# Patient Record
Sex: FEMALE | Race: WHITE | Employment: OTHER | URBAN - METROPOLITAN AREA
[De-identification: names, ages, dates, MRNs, and addresses within clinical notes are randomized per-mention and may not be internally consistent; named-entity substitution may affect disease eponyms.]

---

## 2019-06-19 ENCOUNTER — HOSPITAL ENCOUNTER (OUTPATIENT)
Age: 84
Discharge: HOME | End: 2019-06-21
Payer: MEDICARE

## 2019-06-19 PROCEDURE — 87070 CULTURE OTHR SPECIMN AEROBIC: CPT

## 2019-06-22 LAB
ORGANISM: ABNORMAL
WOUND/ABSCESS: ABNORMAL
WOUND/ABSCESS: ABNORMAL

## 2019-12-11 DIAGNOSIS — I10 ESSENTIAL HYPERTENSION: ICD-10-CM

## 2019-12-11 DIAGNOSIS — Z95.5 STENTED CORONARY ARTERY: ICD-10-CM

## 2019-12-11 DIAGNOSIS — E78.2 MIXED HYPERLIPIDEMIA: ICD-10-CM

## 2019-12-11 DIAGNOSIS — I25.10 CORONARY ARTERY DISEASE INVOLVING NATIVE CORONARY ARTERY OF NATIVE HEART WITHOUT ANGINA PECTORIS: ICD-10-CM

## 2020-02-13 ENCOUNTER — OFFICE VISIT (OUTPATIENT)
Dept: CARDIOLOGY CLINIC | Age: 85
End: 2020-02-13
Payer: MEDICARE

## 2020-02-13 VITALS
BODY MASS INDEX: 27.97 KG/M2 | SYSTOLIC BLOOD PRESSURE: 130 MMHG | HEIGHT: 66 IN | DIASTOLIC BLOOD PRESSURE: 72 MMHG | WEIGHT: 174 LBS | HEART RATE: 79 BPM

## 2020-02-13 PROCEDURE — 93000 ELECTROCARDIOGRAM COMPLETE: CPT | Performed by: INTERNAL MEDICINE

## 2020-02-13 PROCEDURE — 99214 OFFICE O/P EST MOD 30 MIN: CPT | Performed by: INTERNAL MEDICINE

## 2020-02-13 RX ORDER — GABAPENTIN 600 MG/1
600 TABLET ORAL 2 TIMES DAILY
COMMUNITY

## 2020-02-13 RX ORDER — CYANOCOBALAMIN (VITAMIN B-12) 1000 MCG
1 TABLET, EXTENDED RELEASE ORAL 2 TIMES DAILY WITH MEALS
COMMUNITY

## 2020-02-13 RX ORDER — LISINOPRIL 10 MG/1
10 TABLET ORAL 2 TIMES DAILY
COMMUNITY

## 2020-02-13 RX ORDER — AMOXICILLIN 500 MG
CAPSULE ORAL
COMMUNITY
End: 2022-08-08 | Stop reason: ALTCHOICE

## 2020-02-13 NOTE — PROGRESS NOTES
Outpatient Cardiology - Office Visit Follow-up    Date of Consultation: 2/13/2020    HISTORY OF PRESENT ILLNESS:   This is a follow-up for this 71-year-old female with previous history of coronary stent placed in the LAD in 2013. She has a history of hypertension and hyperlipidemia and had an extensive hospital stay for C. difficile in the distant past.  Patient had previous stress test in October 2013 which was negative for stress-induced ischemia. Patient does have a history of valvular heart disease by 2D echocardiogram with report unavailable. She has had a long history of skin cancer (melanoma )and has had major surgery on her right lower extremity appears to be part of the anterior pretibial muscle which has been resected. She now has cancer on her distal left extremity and is awaiting surgery early next week. Patient has concern on today's visit with mid back pain which disappeared after placement of the previous stent in 2009. She states symptoms are concerning for her but she denies any shortness of breath at there are no provocative or  alleviating factors associated with this however she does complain of right shoulder pain   with moving her arm which also affects the back pain. She follows for hypertension and hyperlipidemia with her PCP      Please note: past medical records were reviewed per electronic medical record (EMR) - see detailed reports under Past Medical/ Surgical History.    PAST MEDICAL HISTORY:    Past Medical History:   Diagnosis Date    Asthma     CAD (coronary artery disease)     Cancer ([facility] [address]     melanoma    Heart attack ([facility] [address] 01/2013    Hyperlipidemia     Hypertension     Neuropathy     Stented coronary artery     Wears hearing aid        PAST SURGICAL HISTORY:    Past Surgical History:   Procedure Laterality Date    BACK SURGERY      lumbar    BLADDER SUSPENSION      CORONARY ANGIOPLASTY WITH STENT PLACEMENT  01/2013    3.0 x 20 mm in LAD    DIAGNOSTIC thrills; PMI is non-displaced. Heart Ausculation- Regular rate and rhythm, no murmur. No s3, s4 or rub. ABDOMEN: Soft, non-tender to light palpation. Bowel sounds present. No palpable masses no organomegaly; no abdominal bruit. MS: Good muscle strength and tone. No atrophy or abnormal movements. EXTREMITIES: Right lower extremity with muscle resection distal tibia and marked facial patient's distal left lower extremity awaiting surgery next week  : Deferred  SKIN: Warm and dry. NEURO  CN 2-12 intact  no motor deficits    PSYCH: Oriented to person, place and time. Speech clear and appropriate. Follows all commands. Pleasant affect. DATA:    ECG    (interpreted by me):  Sinus  Rhythm   Low voltage in precordial leads. ABNORMAL       Diagnostic:        Labs:   CBC: No results for input(s): WBC, HGB, HCT, PLT in the last 72 hours. BMP: No results for input(s): NA, K, CO2, BUN, CREATININE, LABGLOM, CALCIUM in the last 72 hours. Invalid input(s): GLU  Mag: No results for input(s): MG in the last 72 hours. Phos: No results for input(s): PHOS in the last 72 hours. TSH: No results for input(s): TSH in the last 72 hours. HgA1c: No results found for: LABA1C  No results found for: EAG  BNP: No results for input(s): BNP in the last 72 hours. PT/INR: No results for input(s): PROTIME, INR in the last 72 hours. APTT:No results for input(s): APTT in the last 72 hours. CARDIAC ENZYMES:No results for input(s): CKTOTAL, CKMB, CKMBINDEX, TROPONINI in the last 72 hours. FASTING LIPID PANEL:No results found for: CHOL, HDL, LDLDIRECT, LDLCALC, TRIG  LIVER PROFILE:No results for input(s): AST, ALT, LABALBU in the last 72 hours. ASSESSMENT:  History of ischemic heart disease previous PCI LAD 2009.   Patient this visit complaining of mid back pain similar to previous complaints with right shoulder pain exacerbated by movement and palpation  History of melanoma with distal right extremity myomectomy and

## 2020-02-14 ENCOUNTER — HOSPITAL ENCOUNTER (OUTPATIENT)
Dept: NON INVASIVE DIAGNOSTICS | Age: 85
Discharge: HOME | End: 2020-02-14
Payer: MEDICARE

## 2020-02-14 ENCOUNTER — HOSPITAL ENCOUNTER (OUTPATIENT)
Dept: NUCLEAR MEDICINE | Age: 85
Discharge: HOME | End: 2020-02-14
Payer: MEDICARE

## 2020-02-14 LAB
LV EF: 60 %
LV EF: 87 %
LVEF MODALITY: NORMAL
LVEF MODALITY: NORMAL

## 2020-02-14 PROCEDURE — 3430000000 HC RX DIAGNOSTIC RADIOPHARMACEUTICAL: Performed by: RADIOLOGY

## 2020-02-14 PROCEDURE — 78452 HT MUSCLE IMAGE SPECT MULT: CPT

## 2020-02-14 PROCEDURE — 93018 CV STRESS TEST I&R ONLY: CPT | Performed by: INTERNAL MEDICINE

## 2020-02-14 PROCEDURE — A9500 TC99M SESTAMIBI: HCPCS | Performed by: RADIOLOGY

## 2020-02-14 PROCEDURE — 93306 TTE W/DOPPLER COMPLETE: CPT

## 2020-02-14 PROCEDURE — 6360000002 HC RX W HCPCS: Performed by: INTERNAL MEDICINE

## 2020-02-14 PROCEDURE — 93017 CV STRESS TEST TRACING ONLY: CPT

## 2020-02-14 PROCEDURE — 93016 CV STRESS TEST SUPVJ ONLY: CPT | Performed by: INTERNAL MEDICINE

## 2020-02-14 RX ADMIN — Medication 30 MILLICURIE: at 13:17

## 2020-02-14 RX ADMIN — REGADENOSON 0.4 MG: 0.08 INJECTION, SOLUTION INTRAVENOUS at 13:25

## 2020-02-14 RX ADMIN — Medication 10 MILLICURIE: at 11:32

## 2020-02-14 NOTE — PROCEDURES
Stress Testing Procedure Note    Type of Stress Testing:  rob scan  Date of Procedure:  2/14/2020  Indication:  Chest pain    Impression:    Baseline ekg is unremarkable No chest pain  or st changes  With IV lexiscan  Nuclear pending            Gavin Ren DO, FACC, FCCP

## 2020-03-04 ENCOUNTER — TELEPHONE (OUTPATIENT)
Dept: CARDIOLOGY CLINIC | Age: 85
End: 2020-03-04

## 2020-11-25 ENCOUNTER — OFFICE VISIT (OUTPATIENT)
Dept: SURGERY | Age: 85
End: 2020-11-25
Payer: MEDICARE

## 2020-11-25 VITALS
BODY MASS INDEX: 28.16 KG/M2 | OXYGEN SATURATION: 84 % | HEIGHT: 66 IN | TEMPERATURE: 98.6 F | WEIGHT: 175.2 LBS | HEART RATE: 52 BPM | DIASTOLIC BLOOD PRESSURE: 76 MMHG | SYSTOLIC BLOOD PRESSURE: 150 MMHG

## 2020-11-25 PROCEDURE — 1036F TOBACCO NON-USER: CPT | Performed by: PLASTIC SURGERY

## 2020-11-25 PROCEDURE — 1090F PRES/ABSN URINE INCON ASSESS: CPT | Performed by: PLASTIC SURGERY

## 2020-11-25 PROCEDURE — G8427 DOCREV CUR MEDS BY ELIG CLIN: HCPCS | Performed by: PLASTIC SURGERY

## 2020-11-25 PROCEDURE — G8484 FLU IMMUNIZE NO ADMIN: HCPCS | Performed by: PLASTIC SURGERY

## 2020-11-25 PROCEDURE — G8400 PT W/DXA NO RESULTS DOC: HCPCS | Performed by: PLASTIC SURGERY

## 2020-11-25 PROCEDURE — 4040F PNEUMOC VAC/ADMIN/RCVD: CPT | Performed by: PLASTIC SURGERY

## 2020-11-25 PROCEDURE — 1123F ACP DISCUSS/DSCN MKR DOCD: CPT | Performed by: PLASTIC SURGERY

## 2020-11-25 PROCEDURE — G8417 CALC BMI ABV UP PARAM F/U: HCPCS | Performed by: PLASTIC SURGERY

## 2020-11-25 PROCEDURE — 99203 OFFICE O/P NEW LOW 30 MIN: CPT | Performed by: PLASTIC SURGERY

## 2020-11-25 NOTE — PROGRESS NOTES
Department of Plastic Surgery - Adult  Attending Consult Note      CHIEF COMPLAINT:   Squamous Cell Cancer of left lower leg    History Obtained From:  patient, daughter    HISTORY OF PRESENT ILLNESS:                The patient is a 81 y.o. 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 The patient states that they first noticed the lesion several months ago. It has  grown in size since they first noticed the lesion. The lesion has  changed in color and has  had discharge or bleeding. The pt has had the lesion biopsied previously. The patient has not had the lesion removed previously. The patient states the lesion is at ttimes painfull. The pt denies any associated symptoms.       Past Medical History:    Past Medical History:   Diagnosis Date    Asthma     CAD (coronary artery disease)     Cancer ([facility] [address]     melanoma    H/O cardiovascular stress test 02/14/2020    Heart attack ([facility] [address] 01/2013    Hyperlipidemia     Hypertension     Neuropathy     Stented coronary artery     Wears hearing aid      Past Surgical History:    Past Surgical History:   Procedure Laterality Date    BACK SURGERY      lumbar    BLADDER SUSPENSION      CORONARY ANGIOPLASTY WITH STENT PLACEMENT  01/2013    3.0 x 20 mm in LAD    DIAGNOSTIC CARDIAC CATH LAB PROCEDURE      HYSTERECTOMY      KNEE SURGERY      both   Leland Funes and Dr. Sim Cohn (twice)    PTCA      TOTAL HIP ARTHROPLASTY      left & right     Current Medications:       Current Outpatient Medications   Medication Sig Dispense Refill    Omega-3 Fatty Acids (FISH OIL) 1200 MG CAPS Take by mouth      calcium citrate-vitamin D (CITRICAL + D) 315-250 MG-UNIT TABS per tablet Take 1 tablet by mouth 2 times daily (with meals)      lisinopril (PRINIVIL;ZESTRIL) 10 MG tablet Take 10 mg by mouth daily      Omeprazole 20 MG TBDD Take by mouth      gabapentin (NEURONTIN) 600 MG tablet Take 600 mg by mouth 3 times daily.  alendronate (FOSAMAX) 70 MG tablet Take 70 mg by mouth Once a week.  clopidogrel (PLAVIX) 75 MG tablet Take 75 mg by mouth Daily.  metoprolol (TOPROL-XL) 25 MG XL tablet Take 12.5 mg by mouth Twice daily.  simvastatin (ZOCOR) 40 MG tablet Take 40 mg by mouth Daily.  therapeutic multivitamin-minerals (THERAGRAN-M) tablet Take 1 tablet by mouth daily. No current facility-administered medications for this visit. Allergies:  Ketamine; Feldene [piroxicam];  Oxycontin [oxycodone hcl]; Sulfa antibiotics; and Pcn [penicillins]    Social History:   Social History     Socioeconomic History    Marital status:      Spouse name: Not on file    Number of children: Not on file    Years of education: Not on file    Highest education level: Not on file   Occupational History    Not on file   Social Needs    Financial resource strain: Not on file    Food insecurity     Worry: Not on file     Inability: Not on file   [employer] needs     Medical: Not on file     Non-medical: Not on file   Tobacco Use    Smoking status: Never Smoker    Smokeless tobacco: Never Used   Substance and Sexual Activity    Alcohol use: Yes     Comment: very little; 2-3 cups coffee/tea daily    Drug use: No    Sexual activity: Not on file   Lifestyle    Physical activity     Days per week: Not on file     Minutes per session: Not on file    Stress: Not on file   Relationships    Social connections     Talks on phone: Not on file     Gets together: Not on file     Attends Voodoo service: Not on file     Active member of club or organization: Not on file     Attends meetings of clubs or organizations: Not on file     Relationship status: Not on file    Intimate partner violence     Fear of current or ex partner: Not on file     Emotionally abused: Not on file     Physically abused: Not on file     Forced sexual activity: Not on file   Other Topics Concern    Not on file   Social History Narrative    Not on file     Family History:   Family History   Problem Relation Age of Onset    Heart Attack Father        REVIEW OF SYSTEMS:    CONSTITUTIONAL:  negative for  fevers, chills, sweats and fatigue  EYES: negative for dipolpia or acute vision loss. RESPIRATORY:  negative for  dry cough, cough with sputum, dyspnea, wheezing and chest pain  HENT:negative for pain, headache, difficulty swallowing or nose bleeds. CARDIOVASCULAR:  negative for  chest pain, dyspnea, palpitations, syncope  GASTROINTESTINAL:  negative for nausea, vomiting, change in bowel habits, diarrhea, constipation and abdominal pain  EXTREMITIES: negative for edema  MUSCULOSKELETAL: negative for muscle weakness  SKIN: positive for lesion, negative for itching or rashes. HEME: negative for easy brusing or bleeding  PSYCH: Alert and oriented to person place and time    I have reviewed the chief complaint and history of present illness including (ROS and PFSH) and vital documentation by my staff and I agree with their documentation and have added when applicable. PHYSICAL EXAM:    VITALS:  BP (!) 150/76 (Site: Left Upper Arm, Position: Sitting, Cuff Size: Medium Adult)   Pulse 52   Temp 98.6 °F (37 °C) (Temporal)   Ht 5' 6\" (1.676 m)   Wt 175 lb 3.2 oz (79.5 kg)   SpO2 (!) 84%   BMI 28.28 kg/m²   CONSTITUTIONAL:  awake, alert, cooperative, no apparent distress, and appears stated age  EYES: PERRLA, EOMI, no signs of occular infection  LUNGS:  No increased work of breathing, good air exchange, clear to auscultation bilaterally, no crackles or wheezing  CARDIOVASCULAR:  Normal apical impulse, regular rate and rhythm  EXTREMITIES: no signs of clubbing or cyanosis. MUSCULOSKELETAL: negative for flaccid muscle tone or spastic movements. NEURO: Cranial nerves II-XII grossly intact. No signs of agitated mood.      SKIN: Biopsy proven Olanta Hammersmith cell cancer of left lower leg-  20mm x 20mm,  red in color, irregular border, raised, no signs of bleeding,drainage or infection. Non tender to palpation. Scaly      DATA:    Labs: CBC: No results found for: WBC, RBC, HGB, HCT, MCV, MCH, MCHC, RDW, PLT, MPV  BMP:  No results found for: NA, K, CL, CO2, BUN, LABALBU, CREATININE, CALCIUM, GFRAA, LABGLOM, GLUCOSE    Radiology Review:  No radiology needed at this time  Pathology Review:  Pathology reviewed           IMPRESSION/RECOMMENDATIONS:        Diagnosis  -) Skin cancer left lower leg      -The patient was counseled on their pathology results and the need for surgical   intervention.   -We will plan to proceed and have the lesion formely excised with margins in the operating room. Patient would likely require full-thickness skin grafting to close this area of the leg.  -The patient will not require frozen sections in the operating room  -The patient will  require pre-op clearance from their PCP. -The risks, benefits and options were discussed with the pt. The risks included but not limited to pain, bleeding, infection, heavy scarring, damage to surrounding structures, fluid collections, asymmetry, and need for further procedures. All of Her questions were answered to their satisfaction and She agrees to proceed with the procedure. Photos obtained        This document is generated, in part, by voice recognition software and thus  syntax and grammatical errors are possible.     Jose Flood  9:34 AM  11/30/2020

## 2020-12-01 ENCOUNTER — TELEPHONE (OUTPATIENT)
Dept: SURGERY | Age: 85
End: 2020-12-01

## 2020-12-01 NOTE — TELEPHONE ENCOUNTER
Patient stated due to Covid Pandemic would like us to call her end of Jan.2021  To check if she is ready to schedule      Need medical clearance    Procedure excision of left lateral leg squamous cell carcinoma with full thickness skin graft

## 2021-01-21 ENCOUNTER — TELEPHONE (OUTPATIENT)
Dept: CARDIOLOGY CLINIC | Age: 86
End: 2021-01-21

## 2021-01-21 NOTE — TELEPHONE ENCOUNTER
Patient on march recall. When I called she said she is seeing Dr. Ene Rothman. Does not need an appointment.

## 2021-02-01 NOTE — TELEPHONE ENCOUNTER
Excision left lateral leg squamous cell carcinoma     Our office needs an medical clearance prior to procedure

## 2021-02-03 NOTE — TELEPHONE ENCOUNTER
Medical Clearance received  Surgery has been scheduled at [address] on 3/16//21, Pre-Admission Testing will call you prior to surgery to inform you arrival time and any other additional directions,if they are unable to reach you,please call them two days prior at [phone]. If taking Fish Oil, Vitamins, two weeks prior to surgery stop taking. If taking NSAIDS (such as Aspirin, Ibuprofen) anticoagulants please consult with your prescribing physician to get further instructions on when to stop medication prior to surgery that is scheduled, patient understood.     Pre-Auth #Medicare  CPT Codes: 81787

## 2021-02-17 NOTE — H&P
Department of Plastic Surgery - Adult  Attending Consult Note        CHIEF COMPLAINT:   Squamous Cell Cancer of left lower leg     History Obtained From:  patient, daughter     HISTORY OF PRESENT ILLNESS:                 The patient is a 81 y.o. 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 The patient states that they first noticed the lesion several months ago. It has  grown in size since they first noticed the lesion. The lesion has  changed in color and has  had discharge or bleeding. The pt has had the lesion biopsied previously. The patient has not had the lesion removed previously. The patient states the lesion is at ttimes painfull.   The pt denies any associated symptoms.       Past Medical History:    Past Medical History        Past Medical History:   Diagnosis Date    Asthma      CAD (coronary artery disease)      Cancer ([facility] [address]       melanoma    H/O cardiovascular stress test 02/14/2020    Heart attack ([facility] [address] 01/2013    Hyperlipidemia      Hypertension      Neuropathy      Stented coronary artery      Wears hearing aid           Past Surgical History:    Past Surgical History         Past Surgical History:   Procedure Laterality Date    BACK SURGERY         lumbar    BLADDER SUSPENSION        CORONARY ANGIOPLASTY WITH STENT PLACEMENT   01/2013     3.0 x 20 mm in LAD    DIAGNOSTIC CARDIAC CATH LAB PROCEDURE        HYSTERECTOMY        KNEE SURGERY         both    MALIGNANT SKIN LESION EXCISION         Dr. En Blank and Dr. Julien Means (twice)    PTCA        TOTAL HIP ARTHROPLASTY         left & right         Current Medications:       Current Facility-Administered Medications          Current Outpatient Medications   Medication Sig Dispense Refill    Omega-3 Fatty Acids (FISH OIL) 1200 MG CAPS Take by mouth        calcium citrate-vitamin D (CITRICAL + D) 315-250 MG-UNIT TABS per tablet Take 1 tablet by mouth 2 times daily (with meals)        lisinopril (PRINIVIL;ZESTRIL) 10 MG tablet Take 10 mg by mouth daily        Omeprazole 20 MG TBDD Take by mouth        gabapentin (NEURONTIN) 600 MG tablet Take 600 mg by mouth 3 times daily.        alendronate (FOSAMAX) 70 MG tablet Take 70 mg by mouth Once a week.        clopidogrel (PLAVIX) 75 MG tablet Take 75 mg by mouth Daily.        metoprolol (TOPROL-XL) 25 MG XL tablet Take 12.5 mg by mouth Twice daily.        simvastatin (ZOCOR) 40 MG tablet Take 40 mg by mouth Daily.        therapeutic multivitamin-minerals (THERAGRAN-M) tablet Take 1 tablet by mouth daily.          No current facility-administered medications for this visit.           Allergies:  Ketamine; Feldene [piroxicam]; Oxycontin [oxycodone hcl]; Sulfa antibiotics; and Pcn [penicillins]     Social History:   Social History               Socioeconomic History    Marital status:        Spouse name: Not on file    Number of children: Not on file    Years of education: Not on file    Highest education level: Not on file   Occupational History    Not on file   Social Needs    Financial resource strain: Not on file    Food insecurity       Worry: Not on file       Inability: Not on file    Transportation needs       Medical: Not on file       Non-medical: Not on file   Tobacco Use    Smoking status: Never Smoker    Smokeless tobacco: Never Used   Substance and Sexual Activity    Alcohol use:  Yes       Comment: very little; 2-3 cups coffee/tea daily    Drug use: No    Sexual activity: Not on file   Lifestyle    Physical activity       Days per week: Not on file       Minutes per session: Not on file    Stress: Not on file   Relationships    Social connections       Talks on phone: Not on file       Gets together: Not on file       Attends Restoration service: Not on file       Active member of club or organization: Not on file       Attends meetings of clubs or organizations: Not on file       Relationship status: Not on file  Intimate partner violence       Fear of current or ex partner: Not on file       Emotionally abused: Not on file       Physically abused: Not on file       Forced sexual activity: Not on file   Other Topics Concern    Not on file   Social History Narrative    Not on file         Family History:   Family History   Family History   Problem Relation Age of Onset    Heart Attack Father              REVIEW OF SYSTEMS:    CONSTITUTIONAL:  negative for  fevers, chills, sweats and fatigue  EYES: negative for dipolpia or acute vision loss. RESPIRATORY:  negative for  dry cough, cough with sputum, dyspnea, wheezing and chest pain  HENT:negative for pain, headache, difficulty swallowing or nose bleeds. CARDIOVASCULAR:  negative for  chest pain, dyspnea, palpitations, syncope  GASTROINTESTINAL:  negative for nausea, vomiting, change in bowel habits, diarrhea, constipation and abdominal pain  EXTREMITIES: negative for edema  MUSCULOSKELETAL: negative for muscle weakness  SKIN: positive for lesion, negative for itching or rashes.   HEME: negative for easy brusing or bleeding  PSYCH: Alert and oriented to person place and time     I have reviewed the chief complaint and history of present illness including (ROS and PFSH) and vital documentation by my staff and I agree with their documentation and have added when applicable.        PHYSICAL EXAM:    VITALS:  BP (!) 150/76 (Site: Left Upper Arm, Position: Sitting, Cuff Size: Medium Adult)   Pulse 52   Temp 98.6 °F (37 °C) (Temporal)   Ht 5' 6\" (1.676 m)   Wt 175 lb 3.2 oz (79.5 kg)   SpO2 (!) 84%   BMI 28.28 kg/m²   CONSTITUTIONAL:  awake, alert, cooperative, no apparent distress, and appears stated age  EYES: PERRLA, EOMI, no signs of occular infection  LUNGS:  No increased work of breathing, good air exchange, clear to auscultation bilaterally, no crackles or wheezing  CARDIOVASCULAR:  Normal apical impulse, regular rate and rhythm  EXTREMITIES: no signs of clubbing or cyanosis. MUSCULOSKELETAL: negative for flaccid muscle tone or spastic movements. NEURO: Cranial nerves II-XII grossly intact. No signs of agitated mood.      SKIN: Biopsy proven Bernette Evener cell cancer of left lower leg-  20mm x 20mm,  red in color, irregular border, raised, no signs of bleeding,drainage or infection. Non tender to palpation. Scaly        DATA:    Labs: CBC: No results found for: WBC, RBC, HGB, HCT, MCV, MCH, MCHC, RDW, PLT, MPV  BMP:  No results found for: NA, K, CL, CO2, BUN, LABALBU, CREATININE, CALCIUM, GFRAA, LABGLOM, GLUCOSE     Radiology Review:  No radiology needed at this time  Pathology Review:  Pathology reviewed              IMPRESSION/RECOMMENDATIONS:          Diagnosis  -) Skin cancer left lower leg        -The patient was counseled on their pathology results and the need for surgical   intervention.   -We will plan to proceed and have the lesion formely excised with margins in the operating room. Patient would likely require full-thickness skin grafting to close this area of the leg.  -The patient will not require frozen sections in the operating room  -The patient will  require pre-op clearance from their PCP.          -The risks, benefits and options were discussed with the pt. The risks included but not limited to pain, bleeding, infection, heavy scarring, damage to surrounding structures, fluid collections, asymmetry, and need for further procedures. All of Her questions were answered to their satisfaction and She agrees to proceed with the procedure.     Attestation    No change in patient's H & P. I have reviewed the procedure with the patient as well as the risk and benefits. They have no further questions and agree to proceed with surgery.     Marizol Summers MD   7:19 AM  3/16/2021

## 2021-03-09 NOTE — PROGRESS NOTES
Patient agreed to COVID test on 3/11/21 at the  [facility]  located at  [address] [place] [address] Drive the hours of 6 am- 2:30 pm, instructed to bring ID. Patient instructed to self isolate until day of surgery.

## 2021-03-10 ENCOUNTER — TELEPHONE (OUTPATIENT)
Dept: SURGERY | Age: 86
End: 2021-03-10

## 2021-03-10 NOTE — PROGRESS NOTES
FABIANO FROM DR BENITEZ RETURNED CALL. PER DR BENITEZ PATIENT MAY REMAIN ON HER PLAVIX. PATIENT CALLED AND INSTRUCTED THIS.

## 2021-03-10 NOTE — PROGRESS NOTES
Ludy 37 PRE-ADMISSION TESTING GENERAL INSTRUCTIONS- [facility]-phone number:[phone]    GENERAL INSTRUCTIONS  [x] Antibacterial Soap shower Night before and/or AM of Surgery  [] Gareth wipe instruction sheet and wipes given. [x] Nothing by mouth after midnight, including gum, candy, mints, or water. [x] You may brush your teeth, gargle, but do NOT swallow water. []Hibiclens shower  the night before and the morning of surgery. Do not use             Hibiclens on your face or head. [x]No smoking, chewing tobacco, illegal drugs, or alcohol within 24 hours of your surgery. [x] Jewelry, valuables or body piercing's should not be brought to the hospital. All body and/or tongue piercing's must be removed prior to arriving to hospital.  ALL hair pins must be removed. [x] Do not wear makeup, lotions, powders, deodorant. Nail polish as directed by the nurse. [x] Arrange transportation with a responsible adult  to and from the hospital. If you do not have a responsible adult  to transport you, you will need to make arrangements with a medical transportation company (i.e. [employer]. A Uber/taxi/bus is not appropriate unless you are accompanied by a responsible adult ). Arrange for someone to be with you for the remainder of the day and for 24 hours after your procedure due to having had anesthesia. Who will be your  for transportation? __DEBBIE________________   Who will be staying with you for 24 hrs after your procedure?______DAUGHTER____________  [x] Bring insurance card and photo ID.  [] Transfusion Bracelet: Please bring with you to hospital, day of surgery  [] Bring urine specimen day of surgery. Any small container is acceptable. [] Use inhalers the morning of surgery and bring with you to hospital.  [x] Bring copy of living will or healthcare power of  papers to be placed in your electronic record.   [] CPAP/BI-PAP: Please bring your machine if you are to spend the night in the hospital.     PARKING INSTRUCTIONS:   [x] Arrival Time:____0530_________  · [x] Parking lot '\"I\"  is located on [facility] (the corner of [facility] and [facility]). To enter, press the button and the gate will lift. A free token will be provided to exit the lot. One car per patient is allowed to park in this lot. All other cars are to park on [address] [place] [address] Street either in the parking garage or the handicap lot. [x] To reach the [facility] lobby from [address] [place] [address], upon entering the hospital, take elevator B to the 3rd floor. EDUCATION INSTRUCTIONS:      [] Knee or hip replacement booklet & exercise pamphlets given. [] Mamta 77 placed in chart. [] Pre-admission Testing educational folder given  [x] Incentive Spirometry,coughing & deep breathing exercises reviewed. [x]Medication information sheet(s)   []Fluoroscopy-Xray used in surgery reviewed with patient. Educational pamphlet placed in chart. [x]Pain: Post-op pain is normal and to be expected. You will be asked to rate your pain from 0-10(a zero is not acceptable-education is needed). Your post-op pain goal is:  [x] Ask your nurse for your pain medication. [] Joint camp offered. [] Joint replacement booklets given. [] Other:___________________________    MEDICATION INSTRUCTIONS:   [x]Bring a complete list of your medications, please write the last time you took the medicine, give this list to the nurse. [x] Take the following medications the morning of surgery with 1-2 ounces of water: SEE LIST  [x] Stop herbal supplements and vitamins 5 days before your surgery. [] DO NOT take any diabetic medicine the morning of surgery. Follow instructions for insulin the day before surgery. [] If you are diabetic and your blood sugar is low or you feel symptomatic, you may drink 1-2 ounces of apple juice or take a glucose tablet.   The morning of your procedure, you may call

## 2021-03-10 NOTE — PROGRESS NOTES
I CALLED DR BENITEZ OFFICE AND SPOKE TO MICA. IN REVIEWING PATIENTS MEDICATION SHE TAKES PLAVIX 75 MG DAILY. PATIENT STATES THAT SHE WAS NOT INSTRUCTED AS TO WHAT TO DO WITH HER PLAVIX PRE OP. MICA WILL INQUIRE AS TO WHAT TO DO WITH HER PLAVIX AND SHE WILL CALL PATIENT AND INFORM HER OF THIS. PATIENT NOTIFIED OF THIS AND SHE WILL CALL DR ELI VELASCO BY TOMORROW MID MORNING IF SHE DOES NOT HEAR BACK BY THEM.

## 2021-03-10 NOTE — TELEPHONE ENCOUNTER
Ambrosio from [facility] called office unclear on plavix order. Patient to have surgery on 3/16/2021.

## 2021-03-11 ENCOUNTER — HOSPITAL ENCOUNTER (OUTPATIENT)
Age: 86
Discharge: HOME | End: 2021-03-13
Payer: MEDICARE

## 2021-03-11 DIAGNOSIS — U07.1 COVID-19: ICD-10-CM

## 2021-03-11 PROCEDURE — U0003 INFECTIOUS AGENT DETECTION BY NUCLEIC ACID (DNA OR RNA); SEVERE ACUTE RESPIRATORY SYNDROME CORONAVIRUS 2 (SARS-COV-2) (CORONAVIRUS DISEASE [COVID-19]), AMPLIFIED PROBE TECHNIQUE, MAKING USE OF HIGH THROUGHPUT TECHNOLOGIES AS DESCRIBED BY CMS-2020-01-R: HCPCS

## 2021-03-12 LAB
SARS-COV-2: NOT DETECTED
SOURCE: NORMAL

## 2021-03-16 ENCOUNTER — ANESTHESIA EVENT (OUTPATIENT)
Dept: OPERATING ROOM | Age: 86
End: 2021-03-16
Payer: MEDICARE

## 2021-03-16 ENCOUNTER — HOSPITAL ENCOUNTER (OUTPATIENT)
Age: 86
Setting detail: OUTPATIENT SURGERY
Discharge: HOME | End: 2021-03-16
Attending: PLASTIC SURGERY | Admitting: PLASTIC SURGERY
Payer: MEDICARE

## 2021-03-16 ENCOUNTER — ANESTHESIA (OUTPATIENT)
Dept: OPERATING ROOM | Age: 86
End: 2021-03-16
Payer: MEDICARE

## 2021-03-16 VITALS
WEIGHT: 176 LBS | HEART RATE: 75 BPM | TEMPERATURE: 97 F | HEIGHT: 66 IN | BODY MASS INDEX: 28.28 KG/M2 | OXYGEN SATURATION: 98 % | DIASTOLIC BLOOD PRESSURE: 72 MMHG | RESPIRATION RATE: 16 BRPM | SYSTOLIC BLOOD PRESSURE: 163 MMHG

## 2021-03-16 VITALS — DIASTOLIC BLOOD PRESSURE: 56 MMHG | OXYGEN SATURATION: 96 % | SYSTOLIC BLOOD PRESSURE: 133 MMHG

## 2021-03-16 DIAGNOSIS — U07.1 COVID-19: Primary | ICD-10-CM

## 2021-03-16 DIAGNOSIS — G89.18 POST-OP PAIN: ICD-10-CM

## 2021-03-16 PROCEDURE — 6360000002 HC RX W HCPCS: Performed by: PHYSICIAN ASSISTANT

## 2021-03-16 PROCEDURE — 3600000002 HC SURGERY LEVEL 2 BASE: Performed by: PLASTIC SURGERY

## 2021-03-16 PROCEDURE — 3600000012 HC SURGERY LEVEL 2 ADDTL 15MIN: Performed by: PLASTIC SURGERY

## 2021-03-16 PROCEDURE — 2580000003 HC RX 258: Performed by: PHYSICIAN ASSISTANT

## 2021-03-16 PROCEDURE — 2500000003 HC RX 250 WO HCPCS: Performed by: PLASTIC SURGERY

## 2021-03-16 PROCEDURE — 7100000011 HC PHASE II RECOVERY - ADDTL 15 MIN: Performed by: PLASTIC SURGERY

## 2021-03-16 PROCEDURE — 11402 EXC TR-EXT B9+MARG 1.1-2 CM: CPT | Performed by: PLASTIC SURGERY

## 2021-03-16 PROCEDURE — 88305 TISSUE EXAM BY PATHOLOGIST: CPT

## 2021-03-16 PROCEDURE — 2709999900 HC NON-CHARGEABLE SUPPLY: Performed by: PLASTIC SURGERY

## 2021-03-16 PROCEDURE — 15220 FTH/GFT FR S/A/L 20 SQ CM/<: CPT | Performed by: PLASTIC SURGERY

## 2021-03-16 PROCEDURE — 6360000002 HC RX W HCPCS: Performed by: NURSE ANESTHETIST, CERTIFIED REGISTERED

## 2021-03-16 PROCEDURE — 3700000000 HC ANESTHESIA ATTENDED CARE: Performed by: PLASTIC SURGERY

## 2021-03-16 PROCEDURE — 11602 EXC TR-EXT MAL+MARG 1.1-2 CM: CPT | Performed by: PLASTIC SURGERY

## 2021-03-16 PROCEDURE — 13121 CMPLX RPR S/A/L 2.6-7.5 CM: CPT | Performed by: PLASTIC SURGERY

## 2021-03-16 PROCEDURE — 7100000010 HC PHASE II RECOVERY - FIRST 15 MIN: Performed by: PLASTIC SURGERY

## 2021-03-16 PROCEDURE — 3700000001 HC ADD 15 MINUTES (ANESTHESIA): Performed by: PLASTIC SURGERY

## 2021-03-16 RX ORDER — CEPHALEXIN 500 MG/1
500 CAPSULE ORAL 4 TIMES DAILY
Qty: 28 CAPSULE | Refills: 0 | Status: SHIPPED | OUTPATIENT
Start: 2021-03-16 | End: 2021-03-23

## 2021-03-16 RX ORDER — SODIUM CHLORIDE 0.9 % (FLUSH) 0.9 %
10 SYRINGE (ML) INJECTION PRN
Status: DISCONTINUED | OUTPATIENT
Start: 2021-03-16 | End: 2021-03-16 | Stop reason: HOSPADM

## 2021-03-16 RX ORDER — LIDOCAINE HYDROCHLORIDE AND EPINEPHRINE 10; 10 MG/ML; UG/ML
INJECTION, SOLUTION INFILTRATION; PERINEURAL PRN
Status: DISCONTINUED | OUTPATIENT
Start: 2021-03-16 | End: 2021-03-16 | Stop reason: ALTCHOICE

## 2021-03-16 RX ORDER — SODIUM CHLORIDE 9 MG/ML
INJECTION, SOLUTION INTRAVENOUS CONTINUOUS
Status: DISCONTINUED | OUTPATIENT
Start: 2021-03-16 | End: 2021-03-16 | Stop reason: HOSPADM

## 2021-03-16 RX ORDER — PROPOFOL 10 MG/ML
INJECTION, EMULSION INTRAVENOUS CONTINUOUS PRN
Status: DISCONTINUED | OUTPATIENT
Start: 2021-03-16 | End: 2021-03-16 | Stop reason: SDUPTHER

## 2021-03-16 RX ORDER — SODIUM CHLORIDE 0.9 % (FLUSH) 0.9 %
10 SYRINGE (ML) INJECTION EVERY 12 HOURS SCHEDULED
Status: DISCONTINUED | OUTPATIENT
Start: 2021-03-16 | End: 2021-03-16 | Stop reason: HOSPADM

## 2021-03-16 RX ORDER — TRAMADOL HYDROCHLORIDE 50 MG/1
50 TABLET ORAL EVERY 6 HOURS PRN
Qty: 10 TABLET | Refills: 0 | Status: SHIPPED | OUTPATIENT
Start: 2021-03-16 | End: 2021-03-21

## 2021-03-16 RX ORDER — FENTANYL CITRATE 50 UG/ML
INJECTION, SOLUTION INTRAMUSCULAR; INTRAVENOUS PRN
Status: DISCONTINUED | OUTPATIENT
Start: 2021-03-16 | End: 2021-03-16 | Stop reason: SDUPTHER

## 2021-03-16 RX ADMIN — SODIUM CHLORIDE: 9 INJECTION, SOLUTION INTRAVENOUS at 06:22

## 2021-03-16 RX ADMIN — SODIUM CHLORIDE: 9 INJECTION, SOLUTION INTRAVENOUS at 07:26

## 2021-03-16 RX ADMIN — FENTANYL CITRATE 50 MCG: 50 INJECTION, SOLUTION INTRAMUSCULAR; INTRAVENOUS at 07:50

## 2021-03-16 RX ADMIN — Medication 2 G: at 07:40

## 2021-03-16 RX ADMIN — FENTANYL CITRATE 50 MCG: 50 INJECTION, SOLUTION INTRAMUSCULAR; INTRAVENOUS at 07:25

## 2021-03-16 RX ADMIN — PROPOFOL 75 MCG/KG/MIN: 10 INJECTION, EMULSION INTRAVENOUS at 07:36

## 2021-03-16 ASSESSMENT — PULMONARY FUNCTION TESTS
PIF_VALUE: 1
PIF_VALUE: 0
PIF_VALUE: 0
PIF_VALUE: 1
PIF_VALUE: 0
PIF_VALUE: 1
PIF_VALUE: 0
PIF_VALUE: 1
PIF_VALUE: 1

## 2021-03-16 ASSESSMENT — ENCOUNTER SYMPTOMS: DYSPNEA ACTIVITY LEVEL: AFTER AMBULATING 1 FLIGHT OF STAIRS

## 2021-03-16 ASSESSMENT — PAIN SCALES - GENERAL
PAINLEVEL_OUTOF10: 0
PAINLEVEL_OUTOF10: 0

## 2021-03-16 NOTE — ANESTHESIA POSTPROCEDURE EVALUATION
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 LEG SQUAM. CELL CARCINOMA)    Surgeons: Jovani Rubio MD Responsible Provider: Anders Ramírez DO    Anesthesia Type: MAC ASA Status: 3          Anesthesia Type: MAC    Poppy Phase I: Poppy Score: 10    Poppy Phase II: Poppy Score: 9    Last vitals: Reviewed and per EMR flowsheets.        Anesthesia Post Evaluation    Patient location during evaluation: bedside  Patient participation: complete - patient cannot participate  Level of consciousness: awake and alert  Airway patency: patent  Nausea & Vomiting: no nausea and no vomiting  Complications: no  Cardiovascular status: blood pressure returned to baseline  Respiratory status: acceptable  Hydration status: euvolemic

## 2021-03-16 NOTE — PROGRESS NOTES
Updated Hank Ruiz via [employer] regarding breakthrough bleeding from LLE surgical site. He wants dressing changed & patient may then DC home.

## 2021-03-16 NOTE — ANESTHESIA PRE PROCEDURE
Department of Anesthesiology  Preprocedure Note       Name:  Kellen Romero   Age:  81 y.o.  :  1935                                          MRN:  [id number]         Date:  3/16/2021      Surgeon: David Garcias):  Harmony Maza MD    Procedure: Procedure(s):  EXCISION LEFT LATERAL LEG SQUAMOUS CELL CARCINOMA, FULL THICKNESS SKIN GRAFT --FROZEN    Medications prior to admission:   Prior to Admission medications    Medication Sig Start Date End Date Taking? Authorizing Provider   calcium citrate-vitamin D (CITRICAL + D) 315-250 MG-UNIT TABS per tablet Take 1 tablet by mouth 2 times daily (with meals)   Yes Historical Provider, MD   lisinopril (PRINIVIL;ZESTRIL) 10 MG tablet Take 10 mg by mouth daily   Yes Historical Provider, MD   Omeprazole 20 MG TBDD Take by mouth   Yes Historical Provider, MD   gabapentin (NEURONTIN) 600 MG tablet Take 600 mg by mouth 5 times daily. Yes Historical Provider, MD   alendronate (FOSAMAX) 70 MG tablet Take 70 mg by mouth Once a week. 12  Yes Historical Provider, MD   metoprolol (TOPROL-XL) 25 MG XL tablet Take 25 mg by mouth daily  13  Yes Historical Provider, MD   simvastatin (ZOCOR) 40 MG tablet Take 40 mg by mouth Daily. 12  Yes Historical Provider, MD   Omega-3 Fatty Acids (FISH OIL) 1200 MG CAPS Take by mouth    Historical Provider, MD   clopidogrel (PLAVIX) 75 MG tablet Take 75 mg by mouth Daily. 13   Historical Provider, MD   therapeutic multivitamin-minerals (THERAGRAN-M) tablet Take 1 tablet by mouth daily.     Historical Provider, MD       Current medications:    Current Facility-Administered Medications   Medication Dose Route Frequency Provider Last Rate Last Admin    sodium chloride flush 0.9 % injection 10 mL  10 mL Intravenous 2 times per day VANESSA Alonso        sodium chloride flush 0.9 % injection 10 mL  10 mL Intravenous PRN VANESSA Alonso        ceFAZolin (ANCEF) 2000 mg in sterile water 20 mL IV syringe  2,000 mg Intravenous On Call to [address] [place]        0.9 % sodium chloride infusion   Intravenous Continuous Roxie [place] 125 mL/hr at 03/16/21 0622 New Bag at 03/16/21 0835       Allergies:     Allergies   Allergen Reactions    Ketamine     Feldene [Piroxicam]     Oxycontin [Oxycodone Hcl]     Sulfa Antibiotics Other (See Comments)     Unsure of reaction due to long ago    Pcn [Penicillins] Rash       Problem List:    Patient Active Problem List   Diagnosis Code    Coronary artery disease involving native coronary artery of native heart without angina pectoris I25.10    Stented coronary artery Z95.5    Mixed hyperlipidemia E78.2    Essential hypertension I10       Past Medical History:        Diagnosis Date    Asthma     CAD (coronary artery disease)     Cancer ([facility] [address]     melanoma    H/O cardiovascular stress test 02/14/2020    Heart attack ([facility] [address] 01/2013    Hyperlipidemia     Hypertension     Neuropathy     Stented coronary artery     Wears hearing aid        Past Surgical History:        Procedure Laterality Date    BACK SURGERY      lumbar    BLADDER SUSPENSION      CORONARY ANGIOPLASTY WITH STENT PLACEMENT  01/2013    3.0 x 20 mm in LAD    DIAGNOSTIC CARDIAC CATH LAB PROCEDURE      HYSTERECTOMY      KNEE SURGERY      both   Carl Villeda and Dr. Sterling Samayoa (twice)    PTCA      TOTAL HIP ARTHROPLASTY      left & right       Social History:    Social History     Tobacco Use    Smoking status: Never Smoker    Smokeless tobacco: Never Used   Substance Use Topics    Alcohol use: Yes     Comment: very little; 2-3 cups coffee/tea daily                                Counseling given: Not Answered      Vital Signs (Current):   Vitals:    03/10/21 1151 03/16/21 0556 03/16/21 0615   BP:   (!) 190/86   Pulse:   88   Resp:   16   Temp:   36.8 °C (98.3 °F)   TempSrc:   Temporal   SpO2:   97%   Weight: 176 lb (79.8 kg) 176 lb (79.8 kg)    Height: 5' 6\" (1.676 m) 5' 6\" (1.676 m)                                               BP Readings from Last 3 Encounters:   03/16/21 (!) 190/86   11/25/20 (!) 150/76   02/13/20 130/72       NPO Status: Time of last liquid consumption: 1930                        Time of last solid consumption: 1930                        Date of last liquid consumption: 03/15/21                        Date of last solid food consumption: 03/15/21    BMI:   Wt Readings from Last 3 Encounters:   03/16/21 176 lb (79.8 kg)   11/25/20 175 lb 3.2 oz (79.5 kg)   02/13/20 174 lb (78.9 kg)     Body mass index is 28.41 kg/m². CBC: No results found for: WBC, RBC, HGB, HCT, MCV, RDW, PLT    CMP: No results found for: NA, K, CL, CO2, BUN, CREATININE, GFRAA, AGRATIO, LABGLOM, GLUCOSE, PROT, CALCIUM, BILITOT, ALKPHOS, AST, ALT    POC Tests: No results for input(s): POCGLU, POCNA, POCK, POCCL, POCBUN, POCHEMO, POCHCT in the last 72 hours.     Coags: No results found for: PROTIME, INR, APTT    HCG (If Applicable): No results found for: PREGTESTUR, PREGSERUM, HCG, HCGQUANT     ABGs: No results found for: PHART, PO2ART, EPX2UVY, DRO4VME, BEART, C5RRMCIR     Type & Screen (If Applicable):  No results found for: LABABO, LABRH    Drug/Infectious Status (If Applicable):  No results found for: HIV, HEPCAB    COVID-19 Screening (If Applicable):   Lab Results   Component Value Date    COVID19 Not Detected 03/11/2021           Anesthesia Evaluation  Patient summary reviewed and Nursing notes reviewed no history of anesthetic complications:   Airway: Mallampati: II  TM distance: >3 FB   Neck ROM: full  Mouth opening: > = 3 FB Dental:          Pulmonary: breath sounds clear to auscultation  (+) asthma (Albuterol inhaler last used months ago.): seasonal asthma,                            Cardiovascular:    (+) hypertension:, past MI (2010): > 6 months, CAD:, CABG/stent (Stent X1):, COSTA: after ambulating 1 flight of stairs, hyperlipidemia        Rhythm: regular  Rate:

## 2021-03-16 NOTE — PROGRESS NOTES
Discharge instructions given and reviewed with patient. Patient  & daughter--verbalizes understanding, no questions regarding care.

## 2021-03-17 NOTE — OP NOTE
name, date of birth, and procedure were confirmed by  all parties present. Prepped and draped in normal sterile fashion. 1%  of lidocaine with epinephrine was then administered circumferentially  around the left lower leg lesion as well as right arm and allowed to  work. Initial lesion on the left lower leg measured 20 x 30 mm.  4-mm  margin was marked. This lesion was then excised using a scalpel, marked  in vivo, and sent for permanent pathology with proper orientation. Final defect measured 20 x 38 mm. Hemostasis was achieved using Bovie  cautery. 20 x 38 mm full-thickness skin graft was then harvested from  the left groin region. This was then defatted and inset using chromic  stitches. A bolster dressing was secured after pie-crusting was  performed using silk stitches. The donor site was then closed after  undermining was performed with a 3-0 Monocryl in a deep dermal fashion  and the skin was closed using a subcuticular 4-0 Monocryl. Dermabond,  Steri-Strips, and Tegaderm were placed overlying this lesion. The right  arm lesion initially measured 11 x 12 mm. This was marked. A 4-mm  margin was marked out and then this was then excised for final defect of  19 x 20 mm. An elliptical incision was then used, and dog ears were  trimmed to allow for tension-free closure and then this area was then  undermined, and the hemostasis was achieved using Bovie cautery. Final  scar length of 50 mm. 3-0 Monocryl for the deep dermal layers were used  to close and 4-0 Monocryl in a subcuticular fashion. Dermabond,  Steri-Strips, and Tegaderm were placed overlying this incision as well. A Rishi wrap was placed in the left lower leg. All counts, sharps, and  laps were correct. Dr. Holly Orellana was present and scrubbed for the entire  case.         [place] Bryant    D: 03/16/2021 11:50:36       T: 03/16/2021 15:23:34     [id number]  Job#: [id number]     Doc#: [id number]    CC:

## 2021-03-29 ENCOUNTER — OFFICE VISIT (OUTPATIENT)
Dept: SURGERY | Age: 86
End: 2021-03-29

## 2021-03-29 VITALS — TEMPERATURE: 96.9 F

## 2021-03-29 DIAGNOSIS — C44.92 SCC (SQUAMOUS CELL CARCINOMA): Primary | ICD-10-CM

## 2021-03-29 PROCEDURE — 99024 POSTOP FOLLOW-UP VISIT: CPT | Performed by: PHYSICIAN ASSISTANT

## 2021-03-29 NOTE — PROGRESS NOTES
Subjective: Follow up today from  1. 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 2.  Full-thickness skin graft. 3.  Excisional biopsy of right arm, suspicious lesion. . Denies fever, nausea, vomiting, leg pain or swelling, pain is absent. The pt states that they have  kept the skin graft covered with bolster dressings sutured in place as well as Steri-Strips and Tegaderm to their donor site. The patient states that they have  finished their antibiotic. They state that their pain is absent. She voices no complaints with her right arm wound site. Objective:    Temp 96.9 °F (36.1 °C) (Temporal)       Donor Wound: Clean, and intact. No signs of infection, wound healing well    Left leg wound: Bolster dressing intact , approximately 10% of the FTSG intact there is noted hematoma deep to the full-thickness skin graft. Right arm: Clean dry and intact no signs of infection wound healing well.   Neuro- Sensation  intact to asif wound sites with light touch     Assessment:    Patient Active Problem List   Diagnosis    Coronary artery disease involving native coronary artery of native heart without angina pectoris    Stented coronary artery    Mixed hyperlipidemia    Essential hypertension    COVID-19       Labs: CBC: No results found for: WBC, RBC, HGB, HCT, MCV, MCH, MCHC, RDW, PLT, MPV  BMP:  No results found for: NA, K, CL, CO2, BUN, LABALBU, CREATININE, CALCIUM, GFRAA, LABGLOM, GLUCOSE      Pathology Report- [address]           [place] [facility] [address]     [address] [place] [address], [address] [place] [address], [facility] [place] [address]               FINAL SURGICAL PATHOLOGY REPORT NAME:           Regina Hendricks             Date of       03/16/2021                                            Collection:   Medical Record   [id number]              Date of       03/16/2021   Number:                                  Receipt:   Age:  81 Y        Sex:  F                Date          03/18/2021 16:39                                            Reported:   Date Of Birth:   1935   Financial        [id number]             Admitting     STERLING BENITEZ   Number:                                  Physician:   Patient          PATRICK NAVARRO         Ordering      STERLING BENITEZ   Location:                                Physician:     Accession Number:  [id number]-                                    Additional Physicians:KIRAN CHAMPAGNE       Diagnosis:   A.  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    Completely excised with negative margins. Negative for invasive malignancy. Solar elastosis. Liriano Barge, left leg lesion, excision:   Inflamed invasive squamous cell carcinoma, associated with scar tissue.    Completely excised; All margins are negative for carcinoma. Plan:     Educated the patient on how to care for their skin graft. Showed the patient that they will need to apply bacitracin overlaying the skin graft and xeroform gauze is to be placed over the bacitracin. This can then be covered with gauze. The patient is to have this dressing changed daily. The patient is able to leave their donor site wound open to air has is healing well. The patient voices understanding with instructions. Dressing supplies given today. Superficial aspect of the nonviable full-thickness skin graft was excised today to granulation tissue. I informed the patient daughter that this will need to be covered once daily bacitracin and a gauze pad to aid in additional mechanical debridement.     F/U 2 weeks  Call office with concerns or signs of infection.     Lala Friday, [place]   9:52 AM  3/29/2021

## 2021-04-12 ENCOUNTER — OFFICE VISIT (OUTPATIENT)
Dept: SURGERY | Age: 86
End: 2021-04-12

## 2021-04-12 VITALS — TEMPERATURE: 97 F

## 2021-04-12 DIAGNOSIS — C44.92 SCC (SQUAMOUS CELL CARCINOMA): Primary | ICD-10-CM

## 2021-04-12 PROCEDURE — 99024 POSTOP FOLLOW-UP VISIT: CPT | Performed by: PHYSICIAN ASSISTANT

## 2021-04-12 NOTE — PROGRESS NOTES
Number:                                  Receipt:   Age:  81 Y        Sex:  F                Date          03/18/2021 16:39                                            Reported:   Date Of Birth:   1935   Financial        [id number]             Admitting     STERLING BENITEZ   Number:                                  Physician:   Patient          PATRICK NAVARRO         Ordering      STERLING BENITEZ   Location:                                Physician:     Accession Number:  [id number]-                                    Additional Physicians:KIARN CHAMPAGNE       Diagnosis:   A.  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    Completely excised with negative margins. Negative for invasive malignancy. Solar elastosis. Darin Gowers, left leg lesion, excision:   Inflamed invasive squamous cell carcinoma, associated with scar tissue.    Completely excised; All margins are negative for carcinoma. Plan:     I informed the patient daughter that the left lower leg full-thickness skin graft is now necrotic after debridement of the full necrotic full-thickness skin graft it is noted there is a resolving hematoma deep to the skin graft. This was mechanically debrided today. I informed the patient daughter that they will need to begin twice a day wet-to-dry dressing changes. The dressing changes were shown to the patient daughter they voiced understanding and ability to do this procedure. Dressing supplies were given to the patient daughter today. The wound was dressed with a gauze pad and tape. I informed the patient that we will see her back in 2 weeks to reassess the wound site I informed him at that time that the wound still will not be healed at that time and this may take several weeks to months to fully heal by secondary intention.     We will also check a area of dry skin to her left posterior leg as she brings us

## 2021-04-19 ENCOUNTER — TELEPHONE (OUTPATIENT)
Dept: SURGERY | Age: 86
End: 2021-04-19

## 2021-04-19 NOTE — TELEPHONE ENCOUNTER
Patient needs saline supplies, I informed patient I can leave some at the  for her to cover her for 4/28/2021

## 2021-04-27 NOTE — PROGRESS NOTES
Date of       03/16/2021   Number:                                  Receipt:   Age:  81 Y        Sex:  F                Date          03/18/2021 16:39                                            Reported:   Date Of Birth:   1935   Financial        [id number]             Admitting     STERLING BENITEZ   Number:                                  Physician:   Patient          PATRICK NAVARRO         Ordering      STERLING BENITEZ   Location:                                Physician:     Accession Number:  HES-                                    Additional Physicians:KIRAN CHAMPAGNE       Diagnosis:   A.  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    Completely excised with negative margins. Negative for invasive malignancy. Solar elastosis. Shayy Knight, left leg lesion, excision:   Inflamed invasive squamous cell carcinoma, associated with scar tissue.    Completely excised; All margins are negative for carcinoma. Plan:     I informed the patient daughter that the left lower leg full-thickness skin graft needs continued twice a day wet-to-dry dressing changes. The dressing changes were shown to the patient daughter they voiced understanding and ability to do this procedure. Dressing supplies were given to the patient daughter today. The wound was dressed with a gauze pad and tape. I informed the patient that we will see her back in 2 weeks to reassess the wound site I informed him at that time that the wound still will not be healed at that time and this may take several weeks to months to fully heal by secondary intention. We will also check a area of dry skin to her left posterior leg as she brings us my attention today and has been present for several years.   I informed her to place OTC lotion to the area twice daily to help with any concern of dry skin as to avoid a unnecessary biopsy if possible. The patient voices understanding with instructions. Dressing supplies given today. F/U 4 weeks  Call office with concerns or signs of infection.     Kristel Mayer

## 2021-04-28 ENCOUNTER — OFFICE VISIT (OUTPATIENT)
Dept: SURGERY | Age: 86
End: 2021-04-28

## 2021-04-28 VITALS
HEIGHT: 66 IN | HEART RATE: 84 BPM | BODY MASS INDEX: 27.32 KG/M2 | OXYGEN SATURATION: 94 % | WEIGHT: 170 LBS | SYSTOLIC BLOOD PRESSURE: 146 MMHG | TEMPERATURE: 97.7 F | DIASTOLIC BLOOD PRESSURE: 76 MMHG | RESPIRATION RATE: 16 BRPM

## 2021-04-28 DIAGNOSIS — C44.92 SCC (SQUAMOUS CELL CARCINOMA): Primary | ICD-10-CM

## 2021-04-28 PROCEDURE — 99024 POSTOP FOLLOW-UP VISIT: CPT | Performed by: PHYSICIAN ASSISTANT

## 2021-05-26 ENCOUNTER — OFFICE VISIT (OUTPATIENT)
Dept: SURGERY | Age: 86
End: 2021-05-26

## 2021-05-26 VITALS — HEART RATE: 80 BPM | OXYGEN SATURATION: 97 % | TEMPERATURE: 98.2 F

## 2021-05-26 DIAGNOSIS — C44.92 SCC (SQUAMOUS CELL CARCINOMA): Primary | ICD-10-CM

## 2021-05-26 PROCEDURE — 99024 POSTOP FOLLOW-UP VISIT: CPT | Performed by: PHYSICIAN ASSISTANT

## 2021-05-26 NOTE — PROGRESS NOTES
Subjective: Follow up today from  1. 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 2.  Full-thickness skin graft. 3.  Excisional biopsy of right arm, suspicious lesion. . Denies fever, nausea, vomiting, leg pain or swelling, pain is absent. She states that her right forearm is well-healed at this time she continues to place bacitracin and gauze pad to the left lower leg full-thickness skin graft. She does complain of some left lower leg edema without any posterior calf pain or difficulties breathing. Objective:    Pulse 80   Temp 98.2 °F (36.8 °C) (Skin)   SpO2 97%       Donor Wound: Clean, and intact. No signs of infection, wound healing well    Left leg wound: 0 % of the FTSG intact there is noted hematoma deep to the full-thickness skin graft. 3 cm x 2.6cm o.1 cm in depth there is noted edema surrounding the wound site and throughout the left lower extremity    Right arm: Clean dry and intact no signs of infection wound healing well.   Neuro- Sensation  intact to asif wound sites with light touch     Assessment:    Patient Active Problem List   Diagnosis    Coronary artery disease involving native coronary artery of native heart without angina pectoris    Stented coronary artery    Mixed hyperlipidemia    Essential hypertension    COVID-19       Labs: CBC: No results found for: WBC, RBC, HGB, HCT, MCV, MCH, MCHC, RDW, PLT, MPV  BMP:  No results found for: NA, K, CL, CO2, BUN, LABALBU, CREATININE, CALCIUM, GFRAA, LABGLOM, GLUCOSE      Pathology Report- [facility]           [facility] [address]     [address]                                                   [address] [facility], [address], [address] [place] [address]           FINAL SURGICAL PATHOLOGY REPORT     NAME:           Alysa Bhandari             Date of       03/16/2021                                            Collection:   Medical Record   [id number]              Date of       03/16/2021   Number:                                  Receipt:   Age:  81 Y        Sex:  F                Date          03/18/2021 16:39                                            Reported:   Date Of Birth:   1935   Financial        [id number]             Admitting     STERLING BENITEZ   Number:                                  Physician:   Patient          PATRICK NAVARRO         Ordering      STERLING BENITEZ   Location:                                Physician:     Accession Number:  [id number]-                                    Additional Physicians:KIRAN CHAMPAGNE       Diagnosis:   A.  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    Completely excised with negative margins. Negative for invasive malignancy. Solar elastosis. Margeret Board, left leg lesion, excision:   Inflamed invasive squamous cell carcinoma, associated with scar tissue.    Completely excised; All margins are negative for carcinoma. Plan:     Left lower leg nonhealing wound    As the wound is becoming marginally smaller we will have her continue with dressing changes but at this time transition to once daily Vaseline covered with a gauze pad. I informed the patient regarding her lower leg edema that she would need to follow-up with her primary care physician for her concerns of edema. Patient voices understanding I encouraged her to continue elevation while at rest of the left lower extremity    Furthermore expressed to the patient as she continues to have an open wound of her left lower leg she will continue to have edema. F/U 8 weeks  Call office with concerns or signs of infection.     Mauricia Habermann, PA

## 2021-06-10 ENCOUNTER — HOSPITAL ENCOUNTER (EMERGENCY)
Age: 86
Discharge: HOME | End: 2021-06-10
Attending: EMERGENCY MEDICINE | Admitting: INTERNAL MEDICINE
Payer: MEDICARE

## 2021-06-10 ENCOUNTER — APPOINTMENT (OUTPATIENT)
Dept: GENERAL RADIOLOGY | Age: 86
End: 2021-06-10
Payer: MEDICARE

## 2021-06-10 ENCOUNTER — APPOINTMENT (OUTPATIENT)
Dept: INTERVENTIONAL RADIOLOGY/VASCULAR | Age: 86
End: 2021-06-10
Payer: MEDICARE

## 2021-06-10 VITALS
OXYGEN SATURATION: 94 % | DIASTOLIC BLOOD PRESSURE: 71 MMHG | RESPIRATION RATE: 20 BRPM | SYSTOLIC BLOOD PRESSURE: 158 MMHG | HEART RATE: 83 BPM | TEMPERATURE: 100.5 F

## 2021-06-10 DIAGNOSIS — L03.116 CELLULITIS OF LEFT LOWER EXTREMITY: Primary | ICD-10-CM

## 2021-06-10 PROBLEM — L03.90 CELLULITIS: Status: ACTIVE | Noted: 2021-06-10

## 2021-06-10 LAB
ALBUMIN SERPL-MCNC: 4 G/DL (ref 3.5–5.2)
ALP BLD-CCNC: 67 U/L (ref 35–104)
ALT SERPL-CCNC: 20 U/L (ref 0–32)
ANION GAP SERPL CALCULATED.3IONS-SCNC: 11 MMOL/L (ref 7–16)
AST SERPL-CCNC: 26 U/L (ref 0–31)
BACTERIA: NORMAL /HPF
BASOPHILS ABSOLUTE: 0.03 E9/L (ref 0–0.2)
BASOPHILS RELATIVE PERCENT: 0.3 % (ref 0–2)
BILIRUB SERPL-MCNC: 0.4 MG/DL (ref 0–1.2)
BILIRUBIN URINE: NEGATIVE
BLOOD, URINE: ABNORMAL
BUN BLDV-MCNC: 16 MG/DL (ref 6–23)
CALCIUM SERPL-MCNC: 9.1 MG/DL (ref 8.6–10.2)
CHLORIDE BLD-SCNC: 101 MMOL/L (ref 98–107)
CLARITY: CLEAR
CO2: 25 MMOL/L (ref 22–29)
COLOR: YELLOW
CREAT SERPL-MCNC: 0.9 MG/DL (ref 0.5–1)
EKG ATRIAL RATE: 84 BPM
EKG P AXIS: 53 DEGREES
EKG P-R INTERVAL: 176 MS
EKG Q-T INTERVAL: 384 MS
EKG QRS DURATION: 92 MS
EKG QTC CALCULATION (BAZETT): 453 MS
EKG R AXIS: 38 DEGREES
EKG T AXIS: 15 DEGREES
EKG VENTRICULAR RATE: 84 BPM
EOSINOPHILS ABSOLUTE: 0.02 E9/L (ref 0.05–0.5)
EOSINOPHILS RELATIVE PERCENT: 0.2 % (ref 0–6)
GFR AFRICAN AMERICAN: >60
GFR NON-AFRICAN AMERICAN: 60 ML/MIN/1.73
GLUCOSE BLD-MCNC: 108 MG/DL (ref 74–99)
GLUCOSE URINE: NEGATIVE MG/DL
HCT VFR BLD CALC: 36.4 % (ref 34–48)
HEMOGLOBIN: 11.8 G/DL (ref 11.5–15.5)
IMMATURE GRANULOCYTES #: 0.07 E9/L
IMMATURE GRANULOCYTES %: 0.6 % (ref 0–5)
KETONES, URINE: NEGATIVE MG/DL
LACTIC ACID: 1.6 MMOL/L (ref 0.5–2.2)
LEUKOCYTE ESTERASE, URINE: NEGATIVE
LYMPHOCYTES ABSOLUTE: 0.77 E9/L (ref 1.5–4)
LYMPHOCYTES RELATIVE PERCENT: 6.5 % (ref 20–42)
MCH RBC QN AUTO: 29.1 PG (ref 26–35)
MCHC RBC AUTO-ENTMCNC: 32.4 % (ref 32–34.5)
MCV RBC AUTO: 89.9 FL (ref 80–99.9)
MONOCYTES ABSOLUTE: 0.7 E9/L (ref 0.1–0.95)
MONOCYTES RELATIVE PERCENT: 5.9 % (ref 2–12)
NEUTROPHILS ABSOLUTE: 10.31 E9/L (ref 1.8–7.3)
NEUTROPHILS RELATIVE PERCENT: 86.5 % (ref 43–80)
NITRITE, URINE: NEGATIVE
PDW BLD-RTO: 14.1 FL (ref 11.5–15)
PH UA: 6.5 (ref 5–9)
PLATELET # BLD: 170 E9/L (ref 130–450)
PMV BLD AUTO: 9.5 FL (ref 7–12)
POTASSIUM REFLEX MAGNESIUM: 4.1 MMOL/L (ref 3.5–5)
PROTEIN UA: NEGATIVE MG/DL
RBC # BLD: 4.05 E12/L (ref 3.5–5.5)
RBC UA: NORMAL /HPF (ref 0–2)
SARS-COV-2, NAAT: NOT DETECTED
SODIUM BLD-SCNC: 137 MMOL/L (ref 132–146)
SPECIFIC GRAVITY UA: <=1.005 (ref 1–1.03)
TOTAL PROTEIN: 7 G/DL (ref 6.4–8.3)
TROPONIN, HIGH SENSITIVITY: 40 NG/L (ref 0–9)
TROPONIN, HIGH SENSITIVITY: 42 NG/L (ref 0–9)
UROBILINOGEN, URINE: 0.2 E.U./DL
WBC # BLD: 11.9 E9/L (ref 4.5–11.5)
WBC UA: NORMAL /HPF (ref 0–5)

## 2021-06-10 PROCEDURE — 6360000002 HC RX W HCPCS: Performed by: STUDENT IN AN ORGANIZED HEALTH CARE EDUCATION/TRAINING PROGRAM

## 2021-06-10 PROCEDURE — 81001 URINALYSIS AUTO W/SCOPE: CPT

## 2021-06-10 PROCEDURE — 87635 SARS-COV-2 COVID-19 AMP PRB: CPT

## 2021-06-10 PROCEDURE — 99283 EMERGENCY DEPT VISIT LOW MDM: CPT

## 2021-06-10 PROCEDURE — 93971 EXTREMITY STUDY: CPT

## 2021-06-10 PROCEDURE — 96361 HYDRATE IV INFUSION ADD-ON: CPT

## 2021-06-10 PROCEDURE — 93005 ELECTROCARDIOGRAM TRACING: CPT | Performed by: EMERGENCY MEDICINE

## 2021-06-10 PROCEDURE — 83605 ASSAY OF LACTIC ACID: CPT

## 2021-06-10 PROCEDURE — 71045 X-RAY EXAM CHEST 1 VIEW: CPT

## 2021-06-10 PROCEDURE — 96365 THER/PROPH/DIAG IV INF INIT: CPT

## 2021-06-10 PROCEDURE — 85025 COMPLETE CBC W/AUTO DIFF WBC: CPT

## 2021-06-10 PROCEDURE — 2580000003 HC RX 258: Performed by: STUDENT IN AN ORGANIZED HEALTH CARE EDUCATION/TRAINING PROGRAM

## 2021-06-10 PROCEDURE — 84484 ASSAY OF TROPONIN QUANT: CPT

## 2021-06-10 PROCEDURE — 1200000000 HC SEMI PRIVATE

## 2021-06-10 PROCEDURE — 80053 COMPREHEN METABOLIC PANEL: CPT

## 2021-06-10 PROCEDURE — 6370000000 HC RX 637 (ALT 250 FOR IP): Performed by: EMERGENCY MEDICINE

## 2021-06-10 PROCEDURE — 93010 ELECTROCARDIOGRAM REPORT: CPT | Performed by: INTERNAL MEDICINE

## 2021-06-10 PROCEDURE — 87040 BLOOD CULTURE FOR BACTERIA: CPT

## 2021-06-10 PROCEDURE — 96366 THER/PROPH/DIAG IV INF ADDON: CPT

## 2021-06-10 RX ORDER — 0.9 % SODIUM CHLORIDE 0.9 %
1000 INTRAVENOUS SOLUTION INTRAVENOUS ONCE
Status: COMPLETED | OUTPATIENT
Start: 2021-06-10 | End: 2021-06-10

## 2021-06-10 RX ORDER — ACETAMINOPHEN 500 MG
1000 TABLET ORAL ONCE
Status: COMPLETED | OUTPATIENT
Start: 2021-06-10 | End: 2021-06-10

## 2021-06-10 RX ORDER — DOXYCYCLINE HYCLATE 100 MG
100 TABLET ORAL 2 TIMES DAILY
Qty: 20 TABLET | Refills: 0 | Status: SHIPPED | OUTPATIENT
Start: 2021-06-10 | End: 2021-06-20

## 2021-06-10 RX ORDER — CEFAZOLIN SODIUM 2 G/50ML
2000 SOLUTION INTRAVENOUS ONCE
Status: COMPLETED | OUTPATIENT
Start: 2021-06-10 | End: 2021-06-10

## 2021-06-10 RX ORDER — DOXYCYCLINE HYCLATE 100 MG
100 TABLET ORAL 2 TIMES DAILY
Qty: 20 TABLET | Refills: 0 | Status: SHIPPED | OUTPATIENT
Start: 2021-06-10 | End: 2021-06-10 | Stop reason: SDUPTHER

## 2021-06-10 RX ADMIN — CEFAZOLIN SODIUM 2000 MG: 2 SOLUTION INTRAVENOUS at 16:29

## 2021-06-10 RX ADMIN — ACETAMINOPHEN 1000 MG: 500 TABLET ORAL at 14:55

## 2021-06-10 RX ADMIN — SODIUM CHLORIDE 1000 ML: 9 INJECTION, SOLUTION INTRAVENOUS at 14:54

## 2021-06-10 ASSESSMENT — ENCOUNTER SYMPTOMS
ABDOMINAL DISTENTION: 0
BACK PAIN: 0
ABDOMINAL PAIN: 0
VOMITING: 0
COUGH: 0
CHEST TIGHTNESS: 0
DIARRHEA: 0
SORE THROAT: 0
SHORTNESS OF BREATH: 0
NAUSEA: 0

## 2021-06-10 ASSESSMENT — PAIN SCALES - GENERAL: PAINLEVEL_OUTOF10: 0

## 2021-06-10 NOTE — ED PROVIDER NOTES
HPI     Patient is a 81 y.o. femalewith a past medical history of coronary artery disease, hyperlipidemia, hypertension who presents with a chief complaint of fatigue. This has been occurring for 2 days. Patient states that it gets better with time. Patient states that it gets worse with nothing. Patient states that it is moderate in severity. Patient presents with 2 days of increasing weakness and chills as well as fatigue. Patient states that is been getting better over the past 2 days. Patient is present with her daughter. Patient states that she had a ulcer on her left shin that was from a skin cancer being removed and the graft to be in place. This has been occurring for a few months. Patient states that that leg normally swells but has gotten worse. Patient notes that she has been having intermittent fevers and chills. Daughter is concerned because states that 2 nights ago patient was in her room sleeping but that the air conditioning on and it was very hot. Patient denies any chest pain, shortness of breath, abdominal pain, changes in urinary or bowel habits. Review of Systems   Constitutional: Positive for fatigue and fever. Negative for activity change, appetite change and chills. HENT: Negative for congestion, drooling and sore throat. Respiratory: Negative for cough, chest tightness and shortness of breath. Cardiovascular: Negative for chest pain and palpitations. Gastrointestinal: Negative for abdominal distention, abdominal pain, diarrhea, nausea and vomiting. Genitourinary: Negative for decreased urine volume, difficulty urinating, enuresis, flank pain, frequency and hematuria. Musculoskeletal: Negative for arthralgias, back pain and neck stiffness. Skin: Positive for wound. Negative for rash. Neurological: Negative for dizziness, facial asymmetry, light-headedness and headaches.    Psychiatric/Behavioral: Negative for agitation, confusion and decreased Abebe Mcneill DO      Patient is a 81 y.o. female presenting with fatigue over the past 2 days. Patient is noted to be febrile at 100.5. Patient will be given Tylenol. Patient has cellulitis clinically of the left leg. Patient on EKG and troponin. Patient's initial high-sensitivity troponin was elevated at 42. Patient have a repeat troponin. Patient had lab work drawn. Patient had blood cultures drawn. Patient will be given a dose of Ancef. Patient had an ultrasound of the left leg. Patient declined admission. Patient be sent home on antibiotics at home. Patient was given return precautions. Patient will follow up with their primary care provider. Patient is agreeable to this plan. Patient has remained stable throughout their stay in the ED. Patient was seen and evaluated by myself and my attending Loreto Engel DO. Assessment and Plan discussed with attending provider, please see attestation for final plan of care. This note was done using dictation software and there may be some grammatical errors associated with this. Mell Be MD       ED Course as of Jun 11 0628   Th Dakotah 10, 2021   8058 Patient is in the bed in no acute distress. The results of today were discussed. Patient adamantly declines admission at this time. She will have a trial of antibiotics at home. She will follow-up outpatient. [MT]      ED Course User Index  [MT] Loreto Engel DO       --------------------------------------------- PAST HISTORY ---------------------------------------------  Past Medical History:  has a past medical history of Asthma, CAD (coronary artery disease), Cancer ([address] H/O cardiovascular stress test, Heart attack ([facility] [facility] Hyperlipidemia, Hypertension, Neuropathy, Stented coronary artery, and Wears hearing aid. Past Surgical History:  has a past surgical history that includes Total hip arthroplasty; knee surgery; back surgery;  Hysterectomy; bladder suspension; Coronary angioplasty with stent (01/2013); Diagnostic Cardiac Cath Lab Procedure; Percutaneous Transluminal Coronary Angio; malignant skin lesion excision; and Leg biopsy excision (Left, 3/16/2021). Social History:  reports that she has never smoked. She has never used smokeless tobacco. She reports current alcohol use. She reports that she does not use drugs. Family History: family history includes Heart Attack in her father. The patients home medications have been reviewed.     Allergies: Feldene [piroxicam], Ketamine, Oxycontin [oxycodone hcl], Sulfa antibiotics, and Pcn [penicillins]    -------------------------------------------------- RESULTS -------------------------------------------------  Labs:  Results for orders placed or performed during the hospital encounter of 06/10/21   COVID-19, Rapid    Specimen: Nasopharyngeal Swab   Result Value Ref Range    SARS-CoV-2, NAAT Not Detected Not Detected   CBC Auto Differential   Result Value Ref Range    WBC 11.9 (H) 4.5 - 11.5 E9/L    RBC 4.05 3.50 - 5.50 E12/L    Hemoglobin 11.8 11.5 - 15.5 g/dL    Hematocrit 36.4 34.0 - 48.0 %    MCV 89.9 80.0 - 99.9 fL    MCH 29.1 26.0 - 35.0 pg    MCHC 32.4 32.0 - 34.5 %    RDW 14.1 11.5 - 15.0 fL    Platelets 710 607 - 211 E9/L    MPV 9.5 7.0 - 12.0 fL    Neutrophils % 86.5 (H) 43.0 - 80.0 %    Immature Granulocytes % 0.6 0.0 - 5.0 %    Lymphocytes % 6.5 (L) 20.0 - 42.0 %    Monocytes % 5.9 2.0 - 12.0 %    Eosinophils % 0.2 0.0 - 6.0 %    Basophils % 0.3 0.0 - 2.0 %    Neutrophils Absolute 10.31 (H) 1.80 - 7.30 E9/L    Immature Granulocytes # 0.07 E9/L    Lymphocytes Absolute 0.77 (L) 1.50 - 4.00 E9/L    Monocytes Absolute 0.70 0.10 - 0.95 E9/L    Eosinophils Absolute 0.02 (L) 0.05 - 0.50 E9/L    Basophils Absolute 0.03 0.00 - 0.20 E9/L   Comprehensive Metabolic Panel w/ Reflex to MG   Result Value Ref Range    Sodium 137 132 - 146 mmol/L    Potassium reflex Magnesium 4.1 3.5 - 5.0 mmol/L    Chloride 101 98 - 107 mmol/L    CO2 25 22 - 29 mmol/L    Anion Gap 11 7 - 16 mmol/L    Glucose 108 (H) 74 - 99 mg/dL    BUN 16 6 - 23 mg/dL    CREATININE 0.9 0.5 - 1.0 mg/dL    GFR Non-African American 60 >=60 mL/min/1.73    GFR African American >60     Calcium 9.1 8.6 - 10.2 mg/dL    Total Protein 7.0 6.4 - 8.3 g/dL    Albumin 4.0 3.5 - 5.2 g/dL    Total Bilirubin 0.4 0.0 - 1.2 mg/dL    Alkaline Phosphatase 67 35 - 104 U/L    ALT 20 0 - 32 U/L    AST 26 0 - 31 U/L   Urinalysis, reflex to microscopic   Result Value Ref Range    Color, UA Yellow Straw/Yellow    Clarity, UA Clear Clear    Glucose, Ur Negative Negative mg/dL    Bilirubin Urine Negative Negative    Ketones, Urine Negative Negative mg/dL    Specific Gravity, UA <=1.005 1.005 - 1.030    Blood, Urine TRACE (A) Negative    pH, UA 6.5 5.0 - 9.0    Protein, UA Negative Negative mg/dL    Urobilinogen, Urine 0.2 <2.0 E.U./dL    Nitrite, Urine Negative Negative    Leukocyte Esterase, Urine Negative Negative   Lactic Acid, Plasma   Result Value Ref Range    Lactic Acid 1.6 0.5 - 2.2 mmol/L   Troponin   Result Value Ref Range    Troponin, High Sensitivity 42 (H) 0 - 9 ng/L   Microscopic Urinalysis   Result Value Ref Range    WBC, UA NONE 0 - 5 /HPF    RBC, UA 0-1 0 - 2 /HPF    Bacteria, UA NONE SEEN None Seen /HPF   Troponin   Result Value Ref Range    Troponin, High Sensitivity 40 (H) 0 - 9 ng/L   EKG 12 Lead   Result Value Ref Range    Ventricular Rate 84 BPM    Atrial Rate 84 BPM    P-R Interval 176 ms    QRS Duration 92 ms    Q-T Interval 384 ms    QTc Calculation (Bazett) 453 ms    P Axis 53 degrees    R Axis 38 degrees    T Axis 15 degrees       Radiology:  XR CHEST PORTABLE   Final Result   Subtle right basilar opacity which may represent atelectasis or pneumonia. US DUP LOWER EXTREMITY LEFT AGUSTINA   Final Result   1. No evidence of DVT in the left lower extremity. 2.  Mildly prominent inguinal lymph node measuring 14 mm. Somewhat oval   morphology.       RECOMMENDATIONS:   Continued clinical follow-up of the left inguinal lymph node suggested. Repeat superficial ultrasound could be considered for increasing size.             ------------------------- NURSING NOTES AND VITALS REVIEWED ---------------------------  Date / Time Roomed:  6/10/2021  1:18 PM  ED Bed Assignment:  15/15    The nursing notes within the ED encounter and vital signs as below have been reviewed. BP (!) 158/71   Pulse 83   Temp 100.5 °F (38.1 °C) (Oral)   Resp 20   SpO2 94%   Oxygen Saturation Interpretation: Normal      ------------------------------------------ PROGRESS NOTES ------------------------------------------  6:28 AM EDT  I have spoken with the patient and discussed todays results, in addition to providing specific details for the plan of care and counseling regarding the diagnosis and prognosis. Their questions are answered at this time and they are agreeable with the plan. I discussed at length with them reasons for immediate return here for re evaluation. They will followup with their primary care physician by calling their office tomorrow. --------------------------------- ADDITIONAL PROVIDER NOTES ---------------------------------  At this time the patient is without objective evidence of an acute process requiring hospitalization or inpatient management. They have remained hemodynamically stable throughout their entire ED visit and are stable for discharge with outpatient follow-up. The plan has been discussed in detail and they are aware of the specific conditions for emergent return, as well as the importance of follow-up. Discharge Medication List as of 6/10/2021  6:34 PM          Diagnosis:  1. Cellulitis of left lower extremity        Disposition:  Patient's disposition: Discharge to home  Patient's condition is stable.          Leticia Krabbe, MD  Resident  06/11/21 8568    ATTENDING PROVIDER ATTESTATION:     Afshin Valdiviach presented to the emergency department for evaluation of Fatigue (Weaknessm chills, fever, x2 days)    I have reviewed and discussed the case, including pertinent history (medical, surgical, family and social) and exam findings with the Resident and the Nurse assigned to [facility]. I have personally performed and/or participated in the history, exam, medical decision making, and procedures and agree with all pertinent clinical information. I have reviewed my findings and recommendations with [facility] and members of family present at the time of disposition. I, Dr. Benjamin Martinez am the primary physician of record for this patient. MDM: The patient is 81 y.o. female  with a past medical history of       Diagnosis Date    Asthma     CAD (coronary artery disease)     Cancer ([facility] [address]     melanoma    H/O cardiovascular stress test 02/14/2020    [facility]    Hyperlipidemia     Hypertension     Neuropathy     Stented coronary artery     Wears hearing aid      presenting to the emergency department with a chief complaint of chills and leg swelling. Differential diagnosis includes cellulitis, sepsis, electrolyte derangement, DVT. The patient did have labs and imaging which were reviewed. The patient did have CBC, CMP unremarkable, lactic acid 1.6, ultrasound negative for any DVT, chest x-ray negative the patient was treated symptomatically. My findings/plan: The encounter diagnosis was Cellulitis of left lower extremity.   Discharge Medication List as of 6/10/2021  6:34 PM        [address], DO  06/16/21 2029

## 2021-06-10 NOTE — Clinical Note
Patient Class: Inpatient [101]   REQUIRED: Diagnosis: Cellulitis [100675]   Estimated Length of Stay: Estimated stay of more than 2 midnights   Admitting Provider: Chiquita Cao [[id number]]

## 2021-06-15 LAB
BLOOD CULTURE, ROUTINE: NORMAL
CULTURE, BLOOD 2: NORMAL

## 2021-06-15 NOTE — PROGRESS NOTES
Subjective: Follow up today from  1. 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 2.  Full-thickness skin graft. 3.  Excisional biopsy of right arm, suspicious lesion. . Denies fever, nausea, vomiting, leg pain or swelling, pain is absent. She states that her right forearm is well-healed at this time. She presents her office for continued wound examination of the left lower leg and concerns for recent history of cellulitis which she has not yet completed antibiotics for at this time. She presented to the emergency department 5 days prior where she was given p.o. antibiotics at that time. Objective: There were no vitals taken for this visit. Donor Wound: Clean, and intact. No signs of infection, wound healing well    Left leg wound: 0 % of the FTSG intact there is noted hematoma deep to the full-thickness skin graft. 2 cm x 2cm x 0 cm in depth there is noted edema surrounding the wound site and throughout the left lower extremity    Right arm: Clean dry and intact no signs of infection wound healing well.   Neuro- Sensation  intact to asif wound sites with light touch     Assessment:    Patient Active Problem List   Diagnosis    Coronary artery disease involving native coronary artery of native heart without angina pectoris    Stented coronary artery    Mixed hyperlipidemia    Essential hypertension    COVID-19    Cellulitis       Labs: CBC:   Lab Results   Component Value Date    WBC 11.9 06/10/2021    RBC 4.05 06/10/2021    HGB 11.8 06/10/2021    HCT 36.4 06/10/2021    MCV 89.9 06/10/2021    MCH 29.1 06/10/2021    MCHC 32.4 06/10/2021    RDW 14.1 06/10/2021     06/10/2021    MPV 9.5 06/10/2021     BMP:    Lab Results   Component Value Date     06/10/2021    K 4.1 06/10/2021     06/10/2021    CO2 25 06/10/2021    BUN 16 06/10/2021    LABALBU 4.0 06/10/2021    CREATININE 0.9 06/10/2021    CALCIUM 9.1 06/10/2021    GFRAA >60 06/10/2021    LABGLOM 60 06/10/2021 GLUCOSE 108 06/10/2021         Pathology Report- [address]       [address]      [facility]     [address] [place]            [address] [facility]                                                     [address], [address], [address] [facility], [address] [place] [address]               FINAL SURGICAL PATHOLOGY REPORT     NAME:           Geovany Chapman             Date of       03/16/2021                                            Collection:   Medical Record   [id number]              Date of       03/16/2021   Number:                                  Receipt:   Age:  81 Y        Sex:  F                Date          03/18/2021 16:39                                            Reported:   Date Of Birth:   1935   Financial        [id number]             Admitting     [employer] [facility]   Number:                                  Physician:   Patient          PATRICK NAVARRO         Ordering      [employer] [facility]   Location:                                Physician:     Accession Number:  HES-                                    Additional Physicians:KIRAN CHAMPAGNE       Diagnosis:   A.  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    Completely excised with negative margins. Negative for invasive malignancy. Solar elastosis. Kaela Coyll, left leg lesion, excision:   Inflamed invasive squamous cell carcinoma, associated with scar tissue.    Completely excised; All margins are negative for carcinoma. Plan:     Left lower leg nonhealing wound    I informed the patient and her daughter today that she does appear to have a left leg cellulitis.   I informed that she will need to continue her antibiotics as previously directed from the emergency department and complete these fully as she was given a 10-day supply she was only completed 5 days at this time. I informed her that there are no signs of infection to her left lower leg wound which continues to heal as expected. I informed her that at her location of the wound and elevated age this will take longer to heal.  She should continue with her current local wound care of Vaseline and gauze pad change daily. I encouraged her to reach back out to her family doctor for continued care for left lower leg cellulitis. I informed her if she cannot get into her family doctor that she would need to return to the emergency department with any worsening symptoms including difficulty breathing or fever and chills. Call office with concerns or signs of infection.     Dianne Wu MD

## 2021-06-16 ENCOUNTER — OFFICE VISIT (OUTPATIENT)
Dept: SURGERY | Age: 86
End: 2021-06-16

## 2021-06-16 VITALS — TEMPERATURE: 97 F

## 2021-06-16 DIAGNOSIS — C44.92 SCC (SQUAMOUS CELL CARCINOMA): Primary | ICD-10-CM

## 2021-06-16 PROCEDURE — 99024 POSTOP FOLLOW-UP VISIT: CPT | Performed by: PLASTIC SURGERY

## 2021-07-07 ENCOUNTER — OFFICE VISIT (OUTPATIENT)
Dept: SURGERY | Age: 86
End: 2021-07-07
Payer: MEDICARE

## 2021-07-07 VITALS — WEIGHT: 170 LBS | HEIGHT: 66 IN | OXYGEN SATURATION: 91 % | TEMPERATURE: 98 F | BODY MASS INDEX: 27.32 KG/M2

## 2021-07-07 DIAGNOSIS — C44.92 SCC (SQUAMOUS CELL CARCINOMA): Primary | ICD-10-CM

## 2021-07-07 PROCEDURE — 1036F TOBACCO NON-USER: CPT | Performed by: PHYSICIAN ASSISTANT

## 2021-07-07 PROCEDURE — G8417 CALC BMI ABV UP PARAM F/U: HCPCS | Performed by: PHYSICIAN ASSISTANT

## 2021-07-07 PROCEDURE — 1123F ACP DISCUSS/DSCN MKR DOCD: CPT | Performed by: PHYSICIAN ASSISTANT

## 2021-07-07 PROCEDURE — G8400 PT W/DXA NO RESULTS DOC: HCPCS | Performed by: PHYSICIAN ASSISTANT

## 2021-07-07 PROCEDURE — G8427 DOCREV CUR MEDS BY ELIG CLIN: HCPCS | Performed by: PHYSICIAN ASSISTANT

## 2021-07-07 PROCEDURE — 1090F PRES/ABSN URINE INCON ASSESS: CPT | Performed by: PHYSICIAN ASSISTANT

## 2021-07-07 PROCEDURE — 4040F PNEUMOC VAC/ADMIN/RCVD: CPT | Performed by: PHYSICIAN ASSISTANT

## 2021-07-07 PROCEDURE — 99212 OFFICE O/P EST SF 10 MIN: CPT | Performed by: PHYSICIAN ASSISTANT

## 2021-07-07 NOTE — PROGRESS NOTES
Subjective: Follow up today from  1. 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 2.  Full-thickness skin graft. 3.  Excisional biopsy of right arm, suspicious lesion. . Denies fever, nausea, vomiting, leg pain or swelling, pain is absent. She states that her right forearm is well-healed at this time. She presents her office for continued wound examination of the left lower leg. She states she has completed her antibiotics are given her by the emergency department for her left lower leg cellulitis. She states she has continued edema to the left lower leg but continues to wear her compression stockings and keeping her left lower extremity elevated while at rest.  She does not state if she has followed with her primary care physician since being discharged from the emergency room for her left lower leg cellulitis. Objective:    Temp 98 °F (36.7 °C) (Temporal)   Ht 5' 6\" (1.676 m)   Wt 170 lb (77.1 kg)   SpO2 91%   Breastfeeding No   BMI 27.44 kg/m²       Donor Wound: Clean, and intact. No signs of infection, wound healing well    Left leg wound: 0 % of the FTSG intact there is noted hematoma deep to the full-thickness skin graft. 0.4cm x 0.4cm x 0 cm in depth there is noted edema surrounding the wound site and throughout the left lower extremity    Right arm: Clean dry and intact no signs of infection wound healing well.   Neuro- Sensation  intact to asif wound sites with light touch     Assessment:    Patient Active Problem List   Diagnosis    Coronary artery disease involving native coronary artery of native heart without angina pectoris    Stented coronary artery    Mixed hyperlipidemia    Essential hypertension    COVID-19    Cellulitis       Labs: CBC:   Lab Results   Component Value Date    WBC 11.9 06/10/2021    RBC 4.05 06/10/2021    HGB 11.8 06/10/2021    HCT 36.4 06/10/2021    MCV 89.9 06/10/2021    MCH 29.1 06/10/2021    MCHC 32.4 06/10/2021    RDW 14.1 06/10/2021  06/10/2021    MPV 9.5 06/10/2021     BMP:    Lab Results   Component Value Date     06/10/2021    K 4.1 06/10/2021     06/10/2021    CO2 25 06/10/2021    BUN 16 06/10/2021    LABALBU 4.0 06/10/2021    CREATININE 0.9 06/10/2021    CALCIUM 9.1 06/10/2021    GFRAA >60 06/10/2021    LABGLOM 60 06/10/2021    GLUCOSE 108 06/10/2021         Pathology Report- [address]           [facility] [address] [place] [address] [address]   [address]     [address] [facility]            [address] [facility]                                                     71 [facility], [address] [place] [address] [address], [address] [facility], [address] [place] [address]               FINAL SURGICAL PATHOLOGY REPORT     NAME:           Antonella Ferrer             Date of       03/16/2021                                            Collection:   Medical Record   [id number]              Date of       03/16/2021   Number:                                  Receipt:   Age:  81 Y        Sex:  F                Date          03/18/2021 16:39                                            Reported:   Date Of Birth:   1935   Financial        [id number]             Admitting     Andela   Number:                                  Physician:   Patient          PATRICK NAVARRO         Ordering      [employer]   Location:                                Physician:     Accession Number:  [id number]-                                    Additional Physicians:KIRAN CHAMPAGNE       Diagnosis:   A.  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    Completely excised with negative margins. Negative for invasive malignancy. Solar elastosis.      Sharmin Peaches, left leg lesion, excision:   Inflamed invasive squamous cell carcinoma, associated with scar tissue.    Completely excised; All margins are negative for carcinoma. Plan:     Left lower leg nonhealing wound    I informed the patient that her wound continues to become smaller and will heal on itself by secondary intention. I informed the patient that it is out of our scope of practice to treat her cellulitis the left lower leg and she will need to have this continued with her primary care physician. I informed the patient at today's office visit there is a small raised lesion just left lateral and superior to her healing wound site that may be scar hypertrophy versus recurrent lesion. I informed the patient that I do not want a biopsy at this time as this is a new onset lesion and I do not want to cause any further wounding next to the area of wounding that has taken several months to become this small and not yet completely healed. Patient voices understanding and appreciation    No restrictions from plastic reconstructive surgery standpoint. I encouraged her to reach back out to her family doctor for continued care for left lower leg cellulitis. Call office with concerns or signs of infection. Follow-up in 6 months for continued surveillance of wound healing completion and possible lesion just superior and lateral to her healing wound.   VANESSA Lion

## 2021-07-28 ENCOUNTER — OFFICE VISIT (OUTPATIENT)
Dept: SURGERY | Age: 86
End: 2021-07-28
Payer: MEDICARE

## 2021-07-28 ENCOUNTER — TELEPHONE (OUTPATIENT)
Dept: SURGERY | Age: 86
End: 2021-07-28

## 2021-07-28 VITALS — TEMPERATURE: 97.2 F

## 2021-07-28 DIAGNOSIS — L98.9 LEG SKIN LESION, LEFT: ICD-10-CM

## 2021-07-28 DIAGNOSIS — C44.92 SCC (SQUAMOUS CELL CARCINOMA): Primary | ICD-10-CM

## 2021-07-28 PROCEDURE — 1036F TOBACCO NON-USER: CPT | Performed by: PLASTIC SURGERY

## 2021-07-28 PROCEDURE — 1090F PRES/ABSN URINE INCON ASSESS: CPT | Performed by: PLASTIC SURGERY

## 2021-07-28 PROCEDURE — 1123F ACP DISCUSS/DSCN MKR DOCD: CPT | Performed by: PLASTIC SURGERY

## 2021-07-28 PROCEDURE — G8400 PT W/DXA NO RESULTS DOC: HCPCS | Performed by: PLASTIC SURGERY

## 2021-07-28 PROCEDURE — 4040F PNEUMOC VAC/ADMIN/RCVD: CPT | Performed by: PLASTIC SURGERY

## 2021-07-28 PROCEDURE — G8417 CALC BMI ABV UP PARAM F/U: HCPCS | Performed by: PLASTIC SURGERY

## 2021-07-28 PROCEDURE — G8427 DOCREV CUR MEDS BY ELIG CLIN: HCPCS | Performed by: PLASTIC SURGERY

## 2021-07-28 PROCEDURE — 99213 OFFICE O/P EST LOW 20 MIN: CPT | Performed by: PLASTIC SURGERY

## 2021-07-28 PROCEDURE — 11102 TANGNTL BX SKIN SINGLE LES: CPT | Performed by: PLASTIC SURGERY

## 2021-07-28 NOTE — PROGRESS NOTES
Subjective: Follow up today from  1. 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 2.  Full-thickness skin graft. 3.  Excisional biopsy of right arm, suspicious lesion. . Denies fever, nausea, vomiting, leg pain or swelling, pain is absent. She presents her office today with a new shave biopsy of her right lower leg which is biopsy-proven squamous cell carcinoma. She presents today with her daughter who states that they have concerns with plan for surgical intervention at this time second to the complications they had on the left lower leg skin cancer excision. They state that the lesion to the left lower leg is also not healed since her last office visit    Objective:    Temp 97.2 °F (36.2 °C) (Temporal)       Donor Wound: Clean, and intact. No signs of infection, wound healing well    Left leg wound: Previous surgical wound site well-healed by secondary intention no signs of infection. There is a remaining lesion just superior to the previous squamous cell carcinoma excision.   Lesion measures 5 x 5 mm round with superficial scabbing    Right lower leg squamous cell carcinoma 15 x 15 mm round superficial scabbing noted no signs of erythema or cellulitis      Assessment:    Patient Active Problem List   Diagnosis    Coronary artery disease involving native coronary artery of native heart without angina pectoris    Stented coronary artery    Mixed hyperlipidemia    Essential hypertension    COVID-19    Cellulitis       Labs: CBC:   Lab Results   Component Value Date    WBC 11.9 06/10/2021    RBC 4.05 06/10/2021    HGB 11.8 06/10/2021    HCT 36.4 06/10/2021    MCV 89.9 06/10/2021    MCH 29.1 06/10/2021    MCHC 32.4 06/10/2021    RDW 14.1 06/10/2021     06/10/2021    MPV 9.5 06/10/2021     BMP:    Lab Results   Component Value Date     06/10/2021    K 4.1 06/10/2021     06/10/2021    CO2 25 06/10/2021    BUN 16 06/10/2021    LABALBU 4.0 06/10/2021    CREATININE 0.9 06/10/2021    CALCIUM 9.1 06/10/2021    GFRAA >60 06/10/2021    LABGLOM 60 06/10/2021    GLUCOSE 108 06/10/2021                 Plan:     Right anterior leg squamous cell carcinoma  Left lower leg nonhealing wound    -We will plan to proceed and have the left lower leg superficial nonhealing lesion biopsied.  -The risks, benefits and options were discussed with the pt. The risks included but not limited to pain, bleeding, infection, heavy scarring, damage to surrounding structures, fluid collections, asymmetry, and need for further procedures. All of Her questions were answered to their satisfaction and She agrees to proceed with the procedure.  -After consent was obtained, the area was cleansed with an alcohol swab. Local anesthesia consisting of 1% lidocaine with 1:100,000 epinepherine was injected  surrounding the area. The local was allowed to work. Using a 10-blade the lesion was shaved, hemostasis was obtained and a bandage was placed. The procedure was performed by madalyn christie    The patient was educated to keep the bandage in place and apply bacitracin to the wound site BID. OK to shower at this time. Advised the patient that they can allow soap and water to rinse of the wound site while showering. Once they are done in the shower they are to pat dry the incision site with a clean paper towel. No baths, hot tubs or soaking of the wound site at this time. Pt voices understanding. I have discussed with the patient that they should make every attempt to follow-up within this time interval in the event that the pathology or radiographic findings require further attention such as surgical or other medical intervention. They voiced complete understanding. I did discuss with the patient I did discuss with the patient and her daughter the biopsy proven squamous cell cancer of the right lower leg and the suspicious nature of the left lesion which were biopsied today.   The patient is very hesitant to have surgery as she knows she would require full-thickness skin graft which has demonstrated failure and previous attempts and resulted in longstanding wound care burden. The patient asked if there are any other options and we did discuss laser palliative treatment to destroy the known lesions to a depth which appears to be therapeutic. They understand he can no way guarantee cure and there would still be a wound that needs to be cared for with daily dressing changes. At this time, the patient would like to think about the best course of action while the biopsy results are coming back. She did say, however, if she were to elect for surgery she did not want a full-thickness skin graft and would rather just leave the wound open to pack as she understands the high likelihood of full-thickness skin graft failure. I have agreed to this. This document is generated, in part, by voice recognition software and thus  syntax and grammatical errors are possible.     Fanta Martini MD  9:49 AM  7/30/2021

## 2021-07-28 NOTE — TELEPHONE ENCOUNTER
Patient daughter called and they have decided to move forward with surgery and not laser. Please prepare surgery sheet and we will start prior auth. I have cancelled in office follow up as daughters request since they have come to a decision now. Does she need a VV for path from today?

## 2021-07-30 RX ORDER — LIDOCAINE HYDROCHLORIDE AND EPINEPHRINE 10; 10 MG/ML; UG/ML
20 INJECTION, SOLUTION INFILTRATION; PERINEURAL ONCE
Status: DISCONTINUED | OUTPATIENT
Start: 2021-07-30 | End: 2021-08-30

## 2021-08-09 ENCOUNTER — TELEPHONE (OUTPATIENT)
Dept: SURGERY | Age: 86
End: 2021-08-09

## 2021-08-16 NOTE — H&P
Department of Plastic Surgery - Adult  Attending Consult Note      CHIEF COMPLAINT:   Skin cancer right leg    History Obtained From:  patient    HISTORY OF PRESENT ILLNESS:                The patient is a 81 y.o. female who presents for Follow up today from  1.  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 2.  Full-thickness skin graft. 3.  Excisional biopsy of right arm, suspicious lesion. . Denies fever, nausea, vomiting, leg pain or swelling, pain is absent. She presents her office today with a new shave biopsy of her right lower leg which is biopsy-proven squamous cell carcinoma. She presents today with her daughter who states that they have concerns with plan for surgical intervention at this time second to the complications they had on the left lower leg skin cancer excision.   They state that the lesion to the left lower leg is also not healed since her last office visit       Past Medical History:    Past Medical History:   Diagnosis Date    Asthma     CAD (coronary artery disease)     Cancer ([facility] [address]     melanoma    H/O cardiovascular stress test 02/14/2020    Heart attack ([facility] [address] 01/2013    Hyperlipidemia     Hypertension     Neuropathy     Stented coronary artery     Wears hearing aid      Past Surgical History:    Past Surgical History:   Procedure Laterality Date    BACK SURGERY      lumbar    BLADDER SUSPENSION      CORONARY ANGIOPLASTY WITH STENT PLACEMENT  01/2013    3.0 x 20 mm in LAD    DIAGNOSTIC CARDIAC CATH LAB PROCEDURE      HYSTERECTOMY      KNEE SURGERY      both    LEG BIOPSY EXCISION Left 3/16/2021    EXCISION LEFT LATERAL LEG SQUAMOUS CELL CARCINOMA, FULL THICKNESS SKIN GRAFT --EXCISIONAL BIOPSY WITH COMPLEX CLOSURE performed by Debby Andres MD at [facility]      Dr. Errol Davis and Dr. Ivory Herzog (twice)    PTCA      TOTAL HIP ARTHROPLASTY      left & right     Current Medications:   Current Facility-Administered Medications Expenses:    Food Insecurity:     Worried About Running Out of Food in the Last Year:     [address] in the Last Year:    Transportation Needs:     Lack of Transportation (Medical):  Lack of Transportation (Non-Medical):    Physical Activity:     Days of Exercise per Week:     Minutes of Exercise per Session:    Stress:     Feeling of Stress :    Social Connections:     Frequency of Communication with Friends and Family:     Frequency of Social Gatherings with Friends and Family:     Attends [employer] Services:     Active Member of Clubs or Organizations:     Attends Club or Organization Meetings:     Marital Status:    Intimate Partner Violence:     Fear of Current or Ex-Partner:     Emotionally Abused:     Physically Abused:     Sexually Abused:      Family History:   Family History   Problem Relation Age of Onset    No Known Problems Mother     Heart Attack Father        REVIEW OF SYSTEMS:    CONSTITUTIONAL:  negative for  fevers, chills, sweats and fatigue  EYES: negative for dipolpia or acute vision loss. RESPIRATORY:  negative for  dry cough, cough with sputum, dyspnea, wheezing and chest pain  HENT:negative for pain, headache, difficulty swallowing or nose bleeds. CARDIOVASCULAR:  negative for  chest pain, dyspnea, palpitations, syncope  GASTROINTESTINAL:  negative for nausea, vomiting, change in bowel habits, diarrhea, and constipation   EXTREMITIES: negative for edema  MUSCULOSKELETAL: negative for muscle weakness  SKIN: negative for itching or rashes. HEME: negative for easy brusing or bleeding  BEHAVIOR/PSYCH:  negative for poor appetite, increased appetite, decreased sleep and poor concentration      Temp 97.2 °F (36.2 °C) (Temporal)         Donor Wound: Clean, and intact. No signs of infection, wound healing well     Left leg wound: Previous surgical wound site well-healed by secondary intention no signs of infection.   There is a remaining lesion just superior to the operation. This document is generated, in part, by voice recognition software and thus  syntax and grammatical errors are possible. Ella Mckenzie MD  10:27 AM  9/2/2021       Attestation    No change in patient's H & P. I have reviewed the procedure with the patient as well as the risk and benefits. They have no further questions and agree to proceed with surgery.     Ella Mckenzie MD   10:28 AM  9/2/2021

## 2021-08-17 NOTE — TELEPHONE ENCOUNTER
Daughter notified patient can stay on the Plavix prior to procedure,she understood and will  Notify her mother.

## 2021-08-24 NOTE — PROGRESS NOTES
Patient states has received the last dose of the COVID vaccine and is 2 weeks post last dose. Patient instructed to bring the [employer] card or a picture of the card,  day of surgery. Patient verbalized understanding.

## 2021-08-30 RX ORDER — ROSUVASTATIN CALCIUM 10 MG/1
20 TABLET, COATED ORAL DAILY
COMMUNITY
Start: 2021-07-12

## 2021-08-30 NOTE — PROGRESS NOTES
[address] 37 PRE-ADMISSION TESTING GENERAL INSTRUCTIONS- [facility]-phone number:[phone]    GENERAL INSTRUCTIONS  [x] Antibacterial Soap shower Night before and/or AM of Surgery  [] Gareth wipe instruction sheet and wipes given. [x] Nothing by mouth after midnight, including gum, candy, mints, or water. [x] You may brush your teeth, gargle, but do NOT swallow water. []Hibiclens shower  the night before and the morning of surgery. Do not use             Hibiclens on your face or head. [x]No smoking, chewing tobacco, illegal drugs, or alcohol within 24 hours of your surgery. [x] Jewelry, valuables or body piercing's should not be brought to the hospital. All body and/or tongue piercing's must be removed prior to arriving to hospital.  ALL hair pins must be removed. [x] Do not wear makeup, lotions, powders, deodorant. Nail polish as directed by the nurse. [x] Arrange transportation with a responsible adult  to and from the hospital. If you do not have a responsible adult  to transport you, you will need to make arrangements with a medical transportation company (i.e. [employer]. A Uber/taxi/bus is not appropriate unless you are accompanied by a responsible adult ). Arrange for someone to be with you for the remainder of the day and for 24 hours after your procedure due to having had anesthesia. Who will be your  for transportation?__________daughter________   Who will be staying with you for 24 hrs after your procedure?_____daughter_____________  [] Bring insurance card and photo ID.  [] Transfusion Bracelet: Please bring with you to hospital, day of surgery  [] Bring urine specimen day of surgery. Any small container is acceptable. [] Use inhalers the morning of surgery and bring with you to hospital.  [] Bring copy of living will or healthcare power of  papers to be placed in your electronic record.   [] CPAP/BI-PAP: Please bring your machine if you are to spend the night in the hospital.     PARKING INSTRUCTIONS:   [x] Arrival Time:______0730-0745_______  · [x] Parking lot '\"I\"  is located on [facility] (the corner of [facility] and [facility]). To enter, press the button and the gate will lift. A free token will be provided to exit the lot. One car per patient is allowed to park in this lot. All other cars are to park on [address] [place] [address] either in the parking garage or the handicap lot. [] To reach the [facility] lobby from [address] [place] [address], upon entering the hospital, take elevator B to the 3rd floor. EDUCATION INSTRUCTIONS:      [] Knee or hip replacement booklet & exercise pamphlets given. [] [address] placed in chart. [] Pre-admission Testing educational folder given  [] Incentive Spirometry,coughing & deep breathing exercises reviewed. []Medication information sheet(s)   []Fluoroscopy-Xray used in surgery reviewed with patient. Educational pamphlet placed in chart. []Pain: Post-op pain is normal and to be expected. You will be asked to rate your pain from 0-10(a zero is not acceptable-education is needed). Your post-op pain goal is:  [] Ask your nurse for your pain medication. [] Joint camp offered. [] Joint replacement booklets given. [] Other:___________________________    MEDICATION INSTRUCTIONS:   [x]Bring a complete list of your medications, please write the last time you took the medicine, give this list to the nurse. [x] Take the following medications the morning of surgery with 1-2 ounces of water: gabapentin prilosec toprol  [x] Stop herbal supplements and vitamins 5 days before your surgery. [] DO NOT take any diabetic medicine the morning of surgery. Follow instructions for insulin the day before surgery. [] If you are diabetic and your blood sugar is low or you feel symptomatic, you may drink 1-2 ounces of apple juice or take a glucose tablet.   The morning of your procedure, you may call the pre-op area if you have concerns about your blood sugar [phone]. [] Use your inhalers the morning of surgery. Bring your emergency inhaler with you day of surgery. [x] Follow physician instructions regarding any blood thinners you may be taking. not to stop plavix told not to take am of surgery  WHAT TO EXPECT:  [x] The day of surgery you will be greeted and checked in by the [employer].  In addition, you will be registered in the [facility] by a Patient Access Representative. Please bring your photo ID and insurance card. A nurse will greet you in accordance to the time you are needed in the pre-op area to prepare you for surgery. Please do not be discouraged if you are not greeted in the order you arrive as there are many variables that are involved in patient preparation. Your patience is greatly appreciated as you wait for your nurse. Please bring in items such as: books, magazines, newspapers, electronics, or any other items  to occupy your time in the waiting area. []  Delays may occur with surgery and staff will make a sincere effort to keep you informed of delays. If any delays occur with your procedure, we apologize ahead of time for your inconvenience as we recognize the value of your time.

## 2021-09-01 NOTE — PROGRESS NOTES
MESSAGE LEFT ON HOME PHONE AND MOBILE PHONE REGARDING TIME CHANGE FOR OR 9/2/21. INSTRUCTED PATIENT TO ARRIVE AT 0930 FOR 1125 PROCEDURE. REQUESTED A CALL BACK TO CONFIRM TIME CHANGE.

## 2021-09-02 ENCOUNTER — ANESTHESIA (OUTPATIENT)
Dept: OPERATING ROOM | Age: 86
End: 2021-09-02
Payer: MEDICARE

## 2021-09-02 ENCOUNTER — ANESTHESIA EVENT (OUTPATIENT)
Dept: OPERATING ROOM | Age: 86
End: 2021-09-02
Payer: MEDICARE

## 2021-09-02 ENCOUNTER — HOSPITAL ENCOUNTER (OUTPATIENT)
Age: 86
Setting detail: OUTPATIENT SURGERY
Discharge: HOME | End: 2021-09-02
Attending: PLASTIC SURGERY | Admitting: PLASTIC SURGERY
Payer: MEDICARE

## 2021-09-02 VITALS
WEIGHT: 174 LBS | HEART RATE: 70 BPM | HEIGHT: 66 IN | TEMPERATURE: 97.3 F | BODY MASS INDEX: 27.97 KG/M2 | OXYGEN SATURATION: 97 % | SYSTOLIC BLOOD PRESSURE: 171 MMHG | DIASTOLIC BLOOD PRESSURE: 68 MMHG | RESPIRATION RATE: 17 BRPM

## 2021-09-02 VITALS
RESPIRATION RATE: 10 BRPM | SYSTOLIC BLOOD PRESSURE: 147 MMHG | DIASTOLIC BLOOD PRESSURE: 72 MMHG | OXYGEN SATURATION: 99 %

## 2021-09-02 DIAGNOSIS — G89.18 POST-OP PAIN: Primary | ICD-10-CM

## 2021-09-02 PROCEDURE — 88331 PATH CONSLTJ SURG 1 BLK 1SPC: CPT

## 2021-09-02 PROCEDURE — 88332 PATH CONSLTJ SURG EA ADD BLK: CPT

## 2021-09-02 PROCEDURE — 3700000000 HC ANESTHESIA ATTENDED CARE: Performed by: PLASTIC SURGERY

## 2021-09-02 PROCEDURE — 2709999900 HC NON-CHARGEABLE SUPPLY: Performed by: PLASTIC SURGERY

## 2021-09-02 PROCEDURE — 11603 EXC TR-EXT MAL+MARG 2.1-3 CM: CPT | Performed by: PLASTIC SURGERY

## 2021-09-02 PROCEDURE — 7100000011 HC PHASE II RECOVERY - ADDTL 15 MIN: Performed by: PLASTIC SURGERY

## 2021-09-02 PROCEDURE — 6360000002 HC RX W HCPCS: Performed by: ANESTHESIOLOGY

## 2021-09-02 PROCEDURE — 2580000003 HC RX 258: Performed by: PHYSICIAN ASSISTANT

## 2021-09-02 PROCEDURE — 3600000002 HC SURGERY LEVEL 2 BASE: Performed by: PLASTIC SURGERY

## 2021-09-02 PROCEDURE — 2500000003 HC RX 250 WO HCPCS: Performed by: PLASTIC SURGERY

## 2021-09-02 PROCEDURE — 6370000000 HC RX 637 (ALT 250 FOR IP): Performed by: PLASTIC SURGERY

## 2021-09-02 PROCEDURE — 6360000002 HC RX W HCPCS

## 2021-09-02 PROCEDURE — 3600000012 HC SURGERY LEVEL 2 ADDTL 15MIN: Performed by: PLASTIC SURGERY

## 2021-09-02 PROCEDURE — 6360000002 HC RX W HCPCS: Performed by: PHYSICIAN ASSISTANT

## 2021-09-02 PROCEDURE — 88305 TISSUE EXAM BY PATHOLOGIST: CPT

## 2021-09-02 PROCEDURE — 3700000001 HC ADD 15 MINUTES (ANESTHESIA): Performed by: PLASTIC SURGERY

## 2021-09-02 PROCEDURE — 7100000010 HC PHASE II RECOVERY - FIRST 15 MIN: Performed by: PLASTIC SURGERY

## 2021-09-02 RX ORDER — SODIUM CHLORIDE 0.9 % (FLUSH) 0.9 %
5-40 SYRINGE (ML) INJECTION PRN
Status: DISCONTINUED | OUTPATIENT
Start: 2021-09-02 | End: 2021-09-02 | Stop reason: HOSPADM

## 2021-09-02 RX ORDER — SODIUM CHLORIDE 0.9 % (FLUSH) 0.9 %
5-40 SYRINGE (ML) INJECTION EVERY 12 HOURS SCHEDULED
Status: DISCONTINUED | OUTPATIENT
Start: 2021-09-02 | End: 2021-09-02 | Stop reason: HOSPADM

## 2021-09-02 RX ORDER — SODIUM CHLORIDE, SODIUM LACTATE, POTASSIUM CHLORIDE, CALCIUM CHLORIDE 600; 310; 30; 20 MG/100ML; MG/100ML; MG/100ML; MG/100ML
INJECTION, SOLUTION INTRAVENOUS CONTINUOUS
Status: CANCELLED | OUTPATIENT
Start: 2021-09-02

## 2021-09-02 RX ORDER — SODIUM CHLORIDE 0.9 % (FLUSH) 0.9 %
5-40 SYRINGE (ML) INJECTION EVERY 12 HOURS SCHEDULED
Status: CANCELLED | OUTPATIENT
Start: 2021-09-02

## 2021-09-02 RX ORDER — SODIUM CHLORIDE 9 MG/ML
25 INJECTION, SOLUTION INTRAVENOUS PRN
Status: DISCONTINUED | OUTPATIENT
Start: 2021-09-02 | End: 2021-09-02 | Stop reason: HOSPADM

## 2021-09-02 RX ORDER — DIAPER,BRIEF,INFANT-TODD,DISP
EACH MISCELLANEOUS PRN
Status: DISCONTINUED | OUTPATIENT
Start: 2021-09-02 | End: 2021-09-02 | Stop reason: ALTCHOICE

## 2021-09-02 RX ORDER — LIDOCAINE HYDROCHLORIDE AND EPINEPHRINE 10; 10 MG/ML; UG/ML
INJECTION, SOLUTION INFILTRATION; PERINEURAL PRN
Status: DISCONTINUED | OUTPATIENT
Start: 2021-09-02 | End: 2021-09-02 | Stop reason: ALTCHOICE

## 2021-09-02 RX ORDER — MIDAZOLAM HYDROCHLORIDE 2 MG/2ML
1 INJECTION, SOLUTION INTRAMUSCULAR; INTRAVENOUS EVERY 10 MIN PRN
Status: DISCONTINUED | OUTPATIENT
Start: 2021-09-02 | End: 2021-09-02 | Stop reason: HOSPADM

## 2021-09-02 RX ORDER — PROPOFOL 10 MG/ML
INJECTION, EMULSION INTRAVENOUS CONTINUOUS PRN
Status: DISCONTINUED | OUTPATIENT
Start: 2021-09-02 | End: 2021-09-02 | Stop reason: SDUPTHER

## 2021-09-02 RX ORDER — SODIUM CHLORIDE 0.9 % (FLUSH) 0.9 %
5-40 SYRINGE (ML) INJECTION PRN
Status: CANCELLED | OUTPATIENT
Start: 2021-09-02

## 2021-09-02 RX ORDER — MIDAZOLAM HYDROCHLORIDE 1 MG/ML
INJECTION INTRAMUSCULAR; INTRAVENOUS PRN
Status: DISCONTINUED | OUTPATIENT
Start: 2021-09-02 | End: 2021-09-02 | Stop reason: SDUPTHER

## 2021-09-02 RX ORDER — SODIUM CHLORIDE 9 MG/ML
25 INJECTION, SOLUTION INTRAVENOUS PRN
Status: CANCELLED | OUTPATIENT
Start: 2021-09-02

## 2021-09-02 RX ORDER — HYDROCODONE BITARTRATE AND ACETAMINOPHEN 5; 325 MG/1; MG/1
1 TABLET ORAL EVERY 6 HOURS PRN
Qty: 10 TABLET | Refills: 0 | Status: SHIPPED | OUTPATIENT
Start: 2021-09-02 | End: 2021-09-09

## 2021-09-02 RX ADMIN — MIDAZOLAM 1 MG: 1 INJECTION INTRAMUSCULAR; INTRAVENOUS at 10:39

## 2021-09-02 RX ADMIN — MIDAZOLAM 1 MG: 1 INJECTION INTRAMUSCULAR; INTRAVENOUS at 11:20

## 2021-09-02 RX ADMIN — SODIUM CHLORIDE 25 ML: 9 INJECTION, SOLUTION INTRAVENOUS at 10:09

## 2021-09-02 RX ADMIN — Medication 2000 MG: at 11:23

## 2021-09-02 RX ADMIN — MIDAZOLAM 1 MG: 1 INJECTION INTRAMUSCULAR; INTRAVENOUS at 11:22

## 2021-09-02 RX ADMIN — PROPOFOL 50 MCG/KG/MIN: 10 INJECTION, EMULSION INTRAVENOUS at 11:23

## 2021-09-02 ASSESSMENT — PULMONARY FUNCTION TESTS
PIF_VALUE: 1
PIF_VALUE: 1
PIF_VALUE: 0
PIF_VALUE: 1
PIF_VALUE: 3
PIF_VALUE: 1
PIF_VALUE: 0
PIF_VALUE: 0
PIF_VALUE: 1
PIF_VALUE: 0
PIF_VALUE: 0
PIF_VALUE: 1
PIF_VALUE: 3
PIF_VALUE: 1

## 2021-09-02 ASSESSMENT — ENCOUNTER SYMPTOMS: DYSPNEA ACTIVITY LEVEL: AFTER AMBULATING 1 FLIGHT OF STAIRS

## 2021-09-02 ASSESSMENT — PAIN SCALES - GENERAL: PAINLEVEL_OUTOF10: 0

## 2021-09-02 ASSESSMENT — PAIN - FUNCTIONAL ASSESSMENT: PAIN_FUNCTIONAL_ASSESSMENT: 0-10

## 2021-09-02 NOTE — ANESTHESIA PRE PROCEDURE
Department of Anesthesiology  Preprocedure Note       Name:  Eddy Artis   Age:  81 y.o.  :  1935                                          MRN:  [id number]         Date:  2021      Surgeon: Kimberly Thao):  Rafaela Junior MD    Procedure: Procedure(s):  EXCISION SQUAMOUS CELL CARCINOMA RIGHT ANTERIOR LEG, FROZEN,    Medications prior to admission:   Prior to Admission medications    Medication Sig Start Date End Date Taking? Authorizing Provider   Multiple Vitamins-Minerals (AIRBORNE GUMMIES PO) Take by mouth    Historical Provider, MD   rosuvastatin (CRESTOR) 10 MG tablet  21   Historical Provider, MD   Omega-3 Fatty Acids (FISH OIL) 1200 MG CAPS Take by mouth    Historical Provider, MD   calcium citrate-vitamin D (CITRICAL + D) 315-250 MG-UNIT TABS per tablet Take 1 tablet by mouth 2 times daily (with meals)    Historical Provider, MD   lisinopril (PRINIVIL;ZESTRIL) 10 MG tablet Take 10 mg by mouth daily    Historical Provider, MD   Omeprazole 20 MG TBDD Take by mouth    Historical Provider, MD   gabapentin (NEURONTIN) 600 MG tablet Take 600 mg by mouth 5 times daily. Historical Provider, MD   alendronate (FOSAMAX) 70 MG tablet Take 70 mg by mouth Once a week. 12   Historical Provider, MD   clopidogrel (PLAVIX) 75 MG tablet Take 75 mg by mouth Daily. 13   Historical Provider, MD   metoprolol (TOPROL-XL) 25 MG XL tablet Take 25 mg by mouth daily  13   Historical Provider, MD   therapeutic multivitamin-minerals (THERAGRAN-M) tablet Take 1 tablet by mouth daily. Historical Provider, MD       Current medications:    No current facility-administered medications for this visit. No current outpatient medications on file.      Facility-Administered Medications Ordered in Other Visits   Medication Dose Route Frequency Provider Last Rate Last Admin    sodium chloride flush 0.9 % injection 5-40 mL  5-40 mL IntraVENous 2 times per day VANESSA Espana        sodium chloride flush 0.9 % injection 5-40 mL  5-40 mL IntraVENous PRN VANESSA Espana        0.9 % sodium chloride infusion  25 mL IntraVENous PRN VANESSA Espana 100 mL/hr at 09/02/21 1009 25 mL at 09/02/21 1009    ceFAZolin (ANCEF) 2000 mg in sterile water 20 mL IV syringe  2,000 mg IntraVENous On Call to [address] [place], [place]           Allergies:     Allergies   Allergen Reactions    Ketamine Other (See Comments)     \"weird\" hallucinate    Feldene [Piroxicam] Other (See Comments)     \"weird \"    Oxycontin [Oxycodone Hcl] Other (See Comments)     \"weird \"    Pcn [Penicillins] Rash    Sulfa Antibiotics Other (See Comments)     Unsure of reaction due to long ago       Problem List:    Patient Active Problem List   Diagnosis Code    Coronary artery disease involving native coronary artery of native heart without angina pectoris I25.10    Stented coronary artery Z95.5    Mixed hyperlipidemia E78.2    Essential hypertension I10    COVID-19 U07.1    Cellulitis L03.90       Past Medical History:        Diagnosis Date    Asthma     CAD (coronary artery disease)     Cancer ([facility] [address]     melanoma    Diabetes mellitus ([facility] [address]     H/O cardiovascular stress test 02/14/2020    Heart attack ([facility] [address] 01/2013    Hyperlipidemia     Hypertension     Neuropathy     Stented coronary artery     Wears hearing aid        Past Surgical History:        Procedure Laterality Date    BACK SURGERY  2007    lumbar    BLADDER SUSPENSION  2002    COLONOSCOPY      CORONARY ANGIOPLASTY WITH STENT PLACEMENT  01/2013    3.0 x 20 mm in LAD    DIAGNOSTIC CARDIAC CATH LAB PROCEDURE      EYE SURGERY      cataract ghada eyes    HYSTERECTOMY  1983    KNEE SURGERY      both    LEG BIOPSY EXCISION Left 03/16/2021    EXCISION LEFT LATERAL LEG SQUAMOUS CELL CARCINOMA, FULL THICKNESS SKIN GRAFT --EXCISIONAL BIOPSY WITH COMPLEX CLOSURE performed by Rafaela Junior MD at [facility] Annalise and Dr. Sheila Feldman (twice)    PTCA      TOTAL HIP ARTHROPLASTY      left 1999 2007 & right 2001       Social History:    Social History     Tobacco Use    Smoking status: Never Smoker    Smokeless tobacco: Never Used   Substance Use Topics    Alcohol use: Yes     Comment: very little; 2-3 cups coffee/tea daily                                Counseling given: Not Answered      Vital Signs (Current): There were no vitals filed for this visit. BP Readings from Last 3 Encounters:   09/02/21 (!) 213/96   06/10/21 (!) 158/71   04/28/21 (!) 146/76       NPO Status:  8                                                                               BMI:   Wt Readings from Last 3 Encounters:   09/02/21 174 lb (78.9 kg)   07/07/21 170 lb (77.1 kg)   04/28/21 170 lb (77.1 kg)     There is no height or weight on file to calculate BMI.    CBC:   Lab Results   Component Value Date    WBC 11.9 06/10/2021    RBC 4.05 06/10/2021    HGB 11.8 06/10/2021    HCT 36.4 06/10/2021    MCV 89.9 06/10/2021    RDW 14.1 06/10/2021     06/10/2021       CMP:   Lab Results   Component Value Date     06/10/2021    K 4.1 06/10/2021     06/10/2021    CO2 25 06/10/2021    BUN 16 06/10/2021    CREATININE 0.9 06/10/2021    GFRAA >60 06/10/2021    LABGLOM 60 06/10/2021    GLUCOSE 108 06/10/2021    PROT 7.0 06/10/2021    CALCIUM 9.1 06/10/2021    BILITOT 0.4 06/10/2021    ALKPHOS 67 06/10/2021    AST 26 06/10/2021    ALT 20 06/10/2021       POC Tests: No results for input(s): POCGLU, POCNA, POCK, POCCL, POCBUN, POCHEMO, POCHCT in the last 72 hours.     Coags: No results found for: PROTIME, INR, APTT    HCG (If Applicable): No results found for: PREGTESTUR, PREGSERUM, HCG, HCGQUANT     ABGs: No results found for: PHART, PO2ART, MRJ2YNV, EZV6POV, BEART, F7ULWTKG     Type & Screen (If Applicable):  No results found for: LABABO, LABRH    Drug/Infectious Status (If Applicable):  No results intraoperative/postoperative concerns discussed with care team.    Barry De Leon MD, MD  9/2/2021  10:26 AM

## 2021-09-02 NOTE — OP NOTE
Operative Note      Patient: Daryl Doe  YOB: 1935  MRN: [id number]    Date of Procedure: 9/2/2021    Pre-Op Diagnosis: SQUAMOUS CELL CARCINOMA    Post-Op Diagnosis: Same       Procedure(s):  EXCISION SQUAMOUS CELL CARCINOMA RIGHT ANTERIOR LEG, FROZEN SECTIONS, PURSE STRING PARTIAL CLOSURE OF WOUND    Surgeon(s):  Abad Garcia MD    Assistant:   Physician Assistant: VANESSA Huitron  Resident: Allyson Guerin DO    Anesthesia: Monitor Anesthesia Care    Estimated Blood Loss (mL): Minimal    Complications: None    Specimens:   ID Type Source Tests Collected by Time Destination   A : Short superior long lateral Tissue Tissue SURGICAL PATHOLOGY Abad Garcia MD 9/2/2021 1130        Implants:  * No implants in log *      Drains: * No LDAs found *        Detailed Description of Procedure:        ASSISTANT: Lakeshia Mendez PA-C. Physician assistant was required for the case due to lack adequately trained assistant was involved in prepping draping retracting dressing . MEASUREMENTS:    Lesion: 15 x 15 mm  Margin: 5 mm  Defect: 25 x 25 mm  Secondary defect after pursestring closure: 20 x 15 mm    PREOPERATIVE INDICATIONS:      The patient is an 81 y.o. female with history of biopsy-proven squamous cell cancer of right lower leg. The patient elected to have this excised in the operating room secondary to size, location, frozen sections, and need for potential advanced reconstruction technique. Risks, benefits, and alternatives were explained to the patient including but not limited to bleeding, scarring, infection, recurrent lesion, anesthesia related complications, and need for additional surgery. They understood and elected to proceed. OPERATIVE PROCEDURE:      The patient was seen the day of surgery, marked, and all questions were answered. The patient was taken back to the operating room, placed in supine position.   SCDs were on and functioning at the time of induction of anesthesia. Preoperative antibiotics were given prior to incision. The area was prepped and draped in the usual sterile fashion with her hexedine prep stick. The area was marked and measured and 5-mm margins were taken circumferentially around the lesion. The area was then anesthetized with 1% lidocaine with 100,000 epinephrine and allowed to work. A 15 blade was used to incise full thickness through the skin and the lesion was sharply lifted off the underlying subcutaneous tissue. The specimen was marked and sent for frozen section with representative margins negative for tumor. Hemostasis was achieved with monopolar cautery. As the patient did not want full-thickness skin graft closure which would have been the most optimal closure, the wound burden was reduced by pursestring closure. A 3-0 Vicryl was then used for this pursestring closure which significantly reduced the size of the wound. Moistened saline Kerlix was placed within the wound followed by a dry dressing surrounding. The patient emerged from anesthesia without complication and taken to PACU in good condition.           Electronically signed by Anastacia Wynn MD on 9/2/2021 at 12:26 PM

## 2021-09-02 NOTE — PROGRESS NOTES
Patient admitted to [facility], placed on all appropriate monitors, all siderails up, cart locked,bed in low position.

## 2021-09-02 NOTE — PROGRESS NOTES
INTRAOPERATIVE CONSULTATION (with FROZEN SECTION)    A. Skin, right anterior leg, excision: Invasive SCC, margins negative for CA.

## 2021-09-03 NOTE — ANESTHESIA POSTPROCEDURE EVALUATION
Department of Anesthesiology  Postprocedure Note    Patient: Zina Lutz  MRN: [id number]  YOB: 1935  Date of evaluation: 9/3/2021  Time:  9:56 AM     Procedure Summary     Date: 09/02/21 Room / Location: [place] [address] / [facility]    Anesthesia Start: 1110 Anesthesia Stop: 0368    Procedure: EXCISION SQUAMOUS CELL CARCINOMA RIGHT ANTERIOR LEG, FROZEN, (Right Leg Lower) Diagnosis: (SQUAMOUS CELL CARCINOMA)    Surgeons: Cortez Saldivar MD Responsible Provider: Judy Cornell MD    Anesthesia Type: MAC ASA Status: 3          Anesthesia Type: MAC    Poppy Phase I: Poppy Score: 10    Poppy Phase II: Poppy Score: 10    Last vitals: Reviewed and per EMR flowsheets.        Anesthesia Post Evaluation    Patient location during evaluation: PACU  Patient participation: complete - patient participated  Level of consciousness: awake and alert  Airway patency: patent  Nausea & Vomiting: no nausea and no vomiting  Complications: no  Cardiovascular status: hemodynamically stable and blood pressure returned to baseline  Respiratory status: acceptable  Hydration status: euvolemic

## 2021-09-08 ENCOUNTER — PROCEDURE VISIT (OUTPATIENT)
Dept: SURGERY | Age: 86
End: 2021-09-08

## 2021-09-08 VITALS
HEIGHT: 66 IN | OXYGEN SATURATION: 96 % | DIASTOLIC BLOOD PRESSURE: 80 MMHG | WEIGHT: 174 LBS | RESPIRATION RATE: 16 BRPM | SYSTOLIC BLOOD PRESSURE: 130 MMHG | TEMPERATURE: 97.4 F | HEART RATE: 58 BPM | BODY MASS INDEX: 27.97 KG/M2

## 2021-09-08 DIAGNOSIS — C44.92 SCC (SQUAMOUS CELL CARCINOMA): Primary | ICD-10-CM

## 2021-09-08 NOTE — PROGRESS NOTES
Subjective: Follow up today from excision of right lower leg squamous cell carcinoma with pursestring reduction in wound size. Denies fever, nausea, vomiting, leg pain or swelling, pain is mild. Patient is here for wound check and possibly erbium YAG laser to actinic keratosis on the contralateral leg    Objective:    /80 (Site: Left Upper Arm, Position: Sitting, Cuff Size: Medium Adult)   Pulse 58   Temp 97.4 °F (36.3 °C) (Skin)   Resp 16   Ht 5' 6\" (1.676 m)   Wt 174 lb (78.9 kg)   SpO2 96%   BMI 28.08 kg/m²       Wound: Clean, dry, and intact, no drainage. Dressing pulled off with stable size. Signs of budding granulation tissue. Mild erythema surrounding as expected. No evidence of overt cellulitis or infection      Assessment:    Patient Active Problem List   Diagnosis    Coronary artery disease involving native coronary artery of native heart without angina pectoris    Stented coronary artery    Mixed hyperlipidemia    Essential hypertension    COVID-19    Cellulitis    Post-op pain   Pathology is pending    Plan:     Continue twice daily dressing changes using wet-to-dry in the left lower extremity. Elevate the leg. No need for antibiotics at this time. Analgesia with Tylenol as needed. Patient like to hold off on laser on the contralateral leg as she would like to deal with only 1 wound at a time. I felt this was acceptable. F/U 2 weeks    Call office with concerns or signs of infection. This document is generated, in part, by voice recognition software and thus  syntax and grammatical errors are possible.     Rowan Bumpers, MD  9:17 AM  9/8/2021

## 2021-09-22 ENCOUNTER — OFFICE VISIT (OUTPATIENT)
Dept: SURGERY | Age: 86
End: 2021-09-22

## 2021-09-22 VITALS — TEMPERATURE: 97.2 F | HEART RATE: 70 BPM | OXYGEN SATURATION: 95 %

## 2021-09-22 DIAGNOSIS — C44.92 SCC (SQUAMOUS CELL CARCINOMA): Primary | ICD-10-CM

## 2021-09-22 PROCEDURE — 99024 POSTOP FOLLOW-UP VISIT: CPT | Performed by: PHYSICIAN ASSISTANT

## 2021-09-22 NOTE — PROGRESS NOTES
Subjective: Follow up today from excision of right lower leg squamous cell carcinoma with pursestring reduction in wound size. Denies fever, nausea, vomiting, leg pain or swelling, pain is abset. Patient is here for wound check. Objective:    Pulse 70   Temp 97.2 °F (36.2 °C) (Temporal)   SpO2 95%   Breastfeeding No       Wound: Clean, dry, and intact, no drainage. Dressing pulled off with stable size. Signs of budding granulation tissue. Mild erythema surrounding as expected. No evidence of overt cellulitis or infection      Assessment:    Patient Active Problem List   Diagnosis    Coronary artery disease involving native coronary artery of native heart without angina pectoris    Stented coronary artery    Mixed hyperlipidemia    Essential hypertension    COVID-19    Cellulitis    Post-op pain   Pathology is pending    Plan:     Continue twice daily dressing changes using wet-to-dry in the left lower extremity. Elevate the leg. No need for antibiotics at this time. Analgesia with Tylenol as needed. Patient like to hold off on laser on the contralateral leg still. Wound dressed today in office. F/U 6 weeks    Call office with concerns or signs of infection. This document is generated, in part, by voice recognition software and thus  syntax and grammatical errors are possible.     Jordin Conley  10:19 AM  9/22/2021

## 2021-11-03 ENCOUNTER — OFFICE VISIT (OUTPATIENT)
Dept: SURGERY | Age: 86
End: 2021-11-03

## 2021-11-03 VITALS — TEMPERATURE: 97.8 F

## 2021-11-03 DIAGNOSIS — C44.92 SCC (SQUAMOUS CELL CARCINOMA): Primary | ICD-10-CM

## 2021-11-03 PROCEDURE — 99024 POSTOP FOLLOW-UP VISIT: CPT | Performed by: PHYSICIAN ASSISTANT

## 2021-11-03 NOTE — PROGRESS NOTES
Subjective: Follow up today from excision of right lower leg squamous cell carcinoma with pursestring reduction in wound size. Denies fever, nausea, vomiting, leg pain or swelling, pain is abset. Patient is here for wound check. She states she is doing a once daily dressing change with lotion. Objective:    Temp 97.8 °F (36.6 °C) (Temporal)   Breastfeeding No       Wound: Clean, dry, and intact, no drainage. Dressing pulled off with stable size. [id number] x 3mm in depth  Signs of budding granulation tissue. Mild erythema surrounding as expected. No evidence of cellulitis or infection      Assessment:    Patient Active Problem List   Diagnosis    Coronary artery disease involving native coronary artery of native heart without angina pectoris    Stented coronary artery    Mixed hyperlipidemia    Essential hypertension    COVID-19    Cellulitis    Post-op pain   Pathology is pending    Plan:     Continue twice daily dressing changes using wet-to-dry in the left lower extremity. Elevate the leg. No need for antibiotics at this time. Analgesia with Tylenol as needed. I informed the patient she can put lotion around the wound site but not over her wound. I encouraged her to increase her dressing changes from once daily to twice daily. Patient like to hold off on laser on the contralateral leg still. Wound dressed today in office. F/U 4 weeks    Call office with concerns or signs of infection. This document is generated, in part, by voice recognition software and thus  syntax and grammatical errors are possible.     Jordin Vergara  10:01 AM  11/3/2021

## 2021-12-01 ENCOUNTER — OFFICE VISIT (OUTPATIENT)
Dept: SURGERY | Age: 86
End: 2021-12-01

## 2021-12-01 VITALS — TEMPERATURE: 97.2 F

## 2021-12-01 DIAGNOSIS — C44.92 SCC (SQUAMOUS CELL CARCINOMA): Primary | ICD-10-CM

## 2021-12-01 PROCEDURE — 99024 POSTOP FOLLOW-UP VISIT: CPT | Performed by: PHYSICIAN ASSISTANT

## 2021-12-01 NOTE — PROGRESS NOTES
Subjective: Follow up today from excision of right lower leg squamous cell carcinoma with pursestring reduction in wound size. Denies fever, nausea, vomiting, leg pain or swelling, pain is abset. Patient is here for wound check. She states she is doing a once daily dressing change with saline over her wound site and circumferential lotion to the surrounding dermis. Objective:    Temp 97.2 °F (36.2 °C) (Temporal)   Breastfeeding No       Wound: Clean, dry, and intact, no drainage. Dressing pulled off with stable size. [id number] x 2mm in depth  Signs of budding granulation tissue. Mild erythema surrounding as expected. No evidence of cellulitis or infection    Left lower leg previous biopsy site actinic keratosis no longer visible well-healed. Assessment:    Patient Active Problem List   Diagnosis    Coronary artery disease involving native coronary artery of native heart without angina pectoris    Stented coronary artery    Mixed hyperlipidemia    Essential hypertension    COVID-19    Cellulitis    Post-op pain       Plan:     Continue twice daily dressing changes using wet-to-dry in the left lower extremity. Elevate the leg. No need for antibiotics at this time. Analgesia with Tylenol as needed. I informed the patient she can put lotion around the wound site but not over her wound. I encouraged her to increase her dressing changes from once daily to twice daily. Patient like to hold off on laser on the contralateral leg still. I advised patient as her lesion is not visible to the left lower leg that does not mean it is gone at this time we will continue to monitor the left lower leg for any residual lesion formation. Wound dressed today in office. F/U 6 weeks    Call office with concerns or signs of infection. This document is generated, in part, by voice recognition software and thus  syntax and grammatical errors are possible.     VANESSA Reynaga  10:48 AM  12/1/2021

## 2022-01-10 ENCOUNTER — OFFICE VISIT (OUTPATIENT)
Dept: SURGERY | Age: 87
End: 2022-01-10
Payer: COMMERCIAL

## 2022-01-10 VITALS — TEMPERATURE: 96.7 F

## 2022-01-10 DIAGNOSIS — C44.92 SCC (SQUAMOUS CELL CARCINOMA): Primary | ICD-10-CM

## 2022-01-10 PROCEDURE — G8417 CALC BMI ABV UP PARAM F/U: HCPCS | Performed by: PHYSICIAN ASSISTANT

## 2022-01-10 PROCEDURE — 99211 OFF/OP EST MAY X REQ PHY/QHP: CPT | Performed by: PHYSICIAN ASSISTANT

## 2022-01-10 PROCEDURE — G8428 CUR MEDS NOT DOCUMENT: HCPCS | Performed by: PHYSICIAN ASSISTANT

## 2022-01-12 NOTE — PROGRESS NOTES
Subjective: Follow up today from excision of right lower leg squamous cell carcinoma with pursestring reduction in wound size. Denies fever, nausea, vomiting, leg pain or swelling, pain is absent. She presents for continued wound examination. Objective:    Temp 96.7 °F (35.9 °C) (Temporal)   Breastfeeding No       Wound: Clean, dry, and intact, no drainage. Dressing pulled off with stable size. 4x4mm x 0mm in depth  Signs of budding granulation tissue. \    Left lower leg previous biopsy site actinic keratosis no longer visible well-healed. Assessment:    Patient Active Problem List   Diagnosis    Coronary artery disease involving native coronary artery of native heart without angina pectoris    Stented coronary artery    Mixed hyperlipidemia    Essential hypertension    COVID-19    Cellulitis    Post-op pain       Plan:     Right lower leg wound site. Continue with Vaseline to wound site and OTC lotion to surrounding dry skin. I informed her that her wound is healing by secondary intention as expected we will continue with covering a gauze pad and as needed  Okay to shower over the wounds at this time. Patient like to hold off on laser on the contralateral leg still. I advised patient as her lesion is not visible to the left lower leg that does not mean it is gone at this time we will continue to monitor the left lower leg for any residual lesion formation. F/U 8 weeks    Call office with concerns or signs of infection. This document is generated, in part, by voice recognition software and thus  syntax and grammatical errors are possible.     Lakshmi [place], [place]  11:27 AM  1/12/2022

## 2022-02-21 ENCOUNTER — OFFICE VISIT (OUTPATIENT)
Dept: SURGERY | Age: 87
End: 2022-02-21
Payer: COMMERCIAL

## 2022-02-21 VITALS — TEMPERATURE: 96.8 F

## 2022-02-21 DIAGNOSIS — C44.92 SCC (SQUAMOUS CELL CARCINOMA): Primary | ICD-10-CM

## 2022-02-21 PROCEDURE — 99211 OFF/OP EST MAY X REQ PHY/QHP: CPT | Performed by: PHYSICIAN ASSISTANT

## 2022-02-21 NOTE — PROGRESS NOTES
Subjective: Follow up today from excision of right lower leg squamous cell carcinoma with pursestring reduction in wound size. Denies fever, nausea, vomiting, leg pain or swelling, pain is absent. She presents for continued wound examination. Objective:    Temp 96.8 °F (36 °C) (Temporal)   Breastfeeding No       Wound: Clean, dry, and intact, no drainage. Wound well-healed    Left lower leg previous biopsy site actinic keratosis no longer visible well-healed. Assessment:    Patient Active Problem List   Diagnosis    Coronary artery disease involving native coronary artery of native heart without angina pectoris    Stented coronary artery    Mixed hyperlipidemia    Essential hypertension    COVID-19    Cellulitis    Post-op pain       Plan:     Right lower leg wound site. As the wound is well-healed to her right lower leg there is no need for continued follow-up for wound care surveillance. I did inform the patient that should her left lower leg lesion reappear from her previous biopsy for actinic keratosis she would need to inform our office for planning of erbium YAG laser destruction. Continue follow-up care with dermatology Dr. Dre Holly. F/U as needed    Call office with concerns or signs of infection. This document is generated, in part, by voice recognition software and thus  syntax and grammatical errors are possible.     Jordin Parker  2:42 PM  2/21/2022

## 2022-08-08 RX ORDER — CLOPIDOGREL BISULFATE 75 MG/1
75 TABLET ORAL DAILY
COMMUNITY

## 2022-08-08 RX ORDER — MULTIVIT WITH MINERALS/LUTEIN
1000 TABLET ORAL DAILY
COMMUNITY

## 2022-08-10 DIAGNOSIS — D09.9 SQUAMOUS CELL CARCINOMA IN SITU: ICD-10-CM

## 2022-08-10 LAB
ANION GAP SERPL CALCULATED.3IONS-SCNC: 16 MMOL/L (ref 7–16)
BUN BLDV-MCNC: 22 MG/DL (ref 6–23)
CALCIUM SERPL-MCNC: 9.8 MG/DL (ref 8.6–10.2)
CHLORIDE BLD-SCNC: 104 MMOL/L (ref 98–107)
CO2: 21 MMOL/L (ref 22–29)
CREAT SERPL-MCNC: 1 MG/DL (ref 0.5–1)
GFR AFRICAN AMERICAN: >60
GFR NON-AFRICAN AMERICAN: 53 ML/MIN/1.73
GLUCOSE BLD-MCNC: 104 MG/DL (ref 74–99)
HCT VFR BLD CALC: 39.3 % (ref 34–48)
HEMOGLOBIN: 12.5 G/DL (ref 11.5–15.5)
MCH RBC QN AUTO: 28.7 PG (ref 26–35)
MCHC RBC AUTO-ENTMCNC: 31.8 % (ref 32–34.5)
MCV RBC AUTO: 90.1 FL (ref 80–99.9)
PDW BLD-RTO: 14.2 FL (ref 11.5–15)
PLATELET # BLD: 216 E9/L (ref 130–450)
PMV BLD AUTO: 9.9 FL (ref 7–12)
POTASSIUM SERPL-SCNC: 4.8 MMOL/L (ref 3.5–5)
RBC # BLD: 4.36 E12/L (ref 3.5–5.5)
SODIUM BLD-SCNC: 141 MMOL/L (ref 132–146)
WBC # BLD: 6.6 E9/L (ref 4.5–11.5)

## 2022-08-12 ENCOUNTER — ANESTHESIA EVENT (OUTPATIENT)
Dept: OPERATING ROOM | Age: 87
End: 2022-08-12
Payer: MEDICARE

## 2022-08-12 NOTE — ANESTHESIA PRE PROCEDURE
Department of Anesthesiology  Preprocedure Note       Name:  Radha Stroud   Age:  81 y.o.  :  1935                                          MRN:  [id number]         Date:  2022      Surgeon: Ruby Cordova):  Henry Miller MD    Procedure: Procedure(s):  EXCISION SQUAMOUS CELL CARCINOMA LEFT LOWER LEG WITH FROZEN SECTION AND SPLIT THICKNESS SKIN GRAFT (CPT 50039, 16133)    Medications prior to admission:   Prior to Admission medications    Medication Sig Start Date End Date Taking? Authorizing Provider   vitamin E 1000 units capsule Take 1,000 Units by mouth in the morning. Historical Provider, MD   Multiple Vitamins-Minerals (CENTRUM SILVER PO) Take by mouth daily    Historical Provider, MD   clopidogrel (PLAVIX) 75 MG tablet Take 75 mg by mouth in the morning. Stopped 5 days pre op. Historical Provider, MD   Multiple Vitamins-Minerals (AIRBORNE GUMMIES PO) Take by mouth    Historical Provider, MD   rosuvastatin (CRESTOR) 10 MG tablet Take 20 mg by mouth in the morning. 21   Historical Provider, MD   calcium citrate-vitamin D (CITRICAL + D) 315-250 MG-UNIT TABS per tablet Take 1 tablet by mouth 2 times daily (with meals)    Historical Provider, MD   lisinopril (PRINIVIL;ZESTRIL) 10 MG tablet Take 10 mg by mouth in the morning and 10 mg before bedtime. Historical Provider, MD   Omeprazole 20 MG TBDD Take 20 mg by mouth daily AM    Historical Provider, MD   gabapentin (NEURONTIN) 600 MG tablet Take 600 mg by mouth in the morning and 600 mg before bedtime. Takes 2 tabs bid. Historical Provider, MD   alendronate (FOSAMAX) 70 MG tablet Take 70 mg by mouth Once a week. 12   Historical Provider, MD   metoprolol (TOPROL-XL) 25 MG XL tablet Take 25 mg by mouth in the morning and 25 mg before bedtime.  AM. 13   Historical Provider, MD       Current medications:    Current Outpatient Medications   Medication Sig Dispense Refill    vitamin E 1000 units capsule Take 1,000 Units by mouth in the morning.  Multiple Vitamins-Minerals (CENTRUM SILVER PO) Take by mouth daily      clopidogrel (PLAVIX) 75 MG tablet Take 75 mg by mouth in the morning. Stopped 5 days pre op.  Multiple Vitamins-Minerals (AIRBORNE GUMMIES PO) Take by mouth      rosuvastatin (CRESTOR) 10 MG tablet Take 20 mg by mouth in the morning.  calcium citrate-vitamin D (CITRICAL + D) 315-250 MG-UNIT TABS per tablet Take 1 tablet by mouth 2 times daily (with meals)      lisinopril (PRINIVIL;ZESTRIL) 10 MG tablet Take 10 mg by mouth in the morning and 10 mg before bedtime.  Omeprazole 20 MG TBDD Take 20 mg by mouth daily AM      gabapentin (NEURONTIN) 600 MG tablet Take 600 mg by mouth in the morning and 600 mg before bedtime. Takes 2 tabs bid.  alendronate (FOSAMAX) 70 MG tablet Take 70 mg by mouth Once a week.  metoprolol (TOPROL-XL) 25 MG XL tablet Take 25 mg by mouth in the morning and 25 mg before bedtime. AM.       No current facility-administered medications for this visit. Allergies:     Allergies   Allergen Reactions    Ketamine Other (See Comments)     \"weird\" hallucinate    Feldene [Piroxicam] Other (See Comments)     \"weird \"    Oxycontin [Oxycodone Hcl] Other (See Comments)     \"weird \"    Pcn [Penicillins] Rash    Sulfa Antibiotics Other (See Comments)     Unsure of reaction due to long ago       Problem List:    Patient Active Problem List   Diagnosis Code    Coronary artery disease involving native coronary artery of native heart without angina pectoris I25.10    Stented coronary artery Z95.5    Mixed hyperlipidemia E78.2    Essential hypertension I10    COVID-19 U07.1    Cellulitis L03.90    Post-op pain G89.18       Past Medical History:        Diagnosis Date    Asthma     CAD (coronary artery disease)     Cancer ([address]     melanoma  legs    Diabetes mellitus ([address]     Gastroesophageal reflux disease without esophagitis     H/O cardiovascular stress test 02/14/2020    Heart [facility] 01/2013    Hyperlipidemia     Hypertension     Neuropathy     Wears hearing aid        Past Surgical History:        Procedure Laterality Date    BACK SURGERY  2007    lumbar    BLADDER SUSPENSION  2002    COLONOSCOPY      CORONARY ANGIOPLASTY WITH STENT PLACEMENT  01/2013    3.0 x 20 mm in LAD   x1 stent    DIAGNOSTIC CARDIAC CATH LAB PROCEDURE      EYE SURGERY      cataract ghada eyes    HYSTERECTOMY (CERVIX STATUS UNKNOWN)  1983    JOINT REPLACEMENT Bilateral     knees    KNEE SURGERY Bilateral     LEG BIOPSY EXCISION Left 03/16/2021    EXCISION LEFT LATERAL LEG SQUAMOUS CELL CARCINOMA, FULL THICKNESS SKIN GRAFT --EXCISIONAL BIOPSY WITH COMPLEX CLOSURE performed by Kristel Rodrigues MD at [facility] Right 09/02/2021    EXCISION SQUAMOUS CELL CARCINOMA RIGHT ANTERIOR LEG, FROZEN, performed by Kristel Rodrigues MD at [facility]      Dr. Fabián Sy and Dr. Luis E Alvarado (twice)   melanoma    TOTAL HIP ARTHROPLASTY Bilateral     left 1999 & revision 2007 & right 2001       Social History:    Social History     Tobacco Use    Smoking status: Never    Smokeless tobacco: Never   Substance Use Topics    Alcohol use: Not Currently                                Counseling given: Not Answered      Vital Signs (Current): There were no vitals filed for this visit.                                            BP Readings from Last 3 Encounters:   09/08/21 130/80   09/02/21 (!) 171/68   09/02/21 (!) 147/72       NPO Status:  8                                                                               BMI:   Wt Readings from Last 3 Encounters:   09/08/21 174 lb (78.9 kg)   09/02/21 174 lb (78.9 kg)   07/07/21 170 lb (77.1 kg)     There is no height or weight on file to calculate BMI.    CBC:   Lab Results   Component Value Date/Time    WBC 6.6 08/10/2022 10:44 AM    RBC 4.36 08/10/2022 10:44 AM    HGB 12.5 08/10/2022 10:44 AM    HCT 39.3 08/10/2022 10:44 AM    MCV 90.1 08/10/2022 10:44 AM    RDW 14.2 08/10/2022 10:44 AM     08/10/2022 10:44 AM       CMP:   Lab Results   Component Value Date/Time     08/10/2022 10:44 AM    K 4.8 08/10/2022 10:44 AM    K 4.1 06/10/2021 02:09 PM     08/10/2022 10:44 AM    CO2 21 08/10/2022 10:44 AM    BUN 22 08/10/2022 10:44 AM    CREATININE 1.0 08/10/2022 10:44 AM    GFRAA >60 08/10/2022 10:44 AM    LABGLOM 53 08/10/2022 10:44 AM    GLUCOSE 104 08/10/2022 10:44 AM    PROT 7.0 06/10/2021 02:09 PM    CALCIUM 9.8 08/10/2022 10:44 AM    BILITOT 0.4 06/10/2021 02:09 PM    ALKPHOS 67 06/10/2021 02:09 PM    AST 26 06/10/2021 02:09 PM    ALT 20 06/10/2021 02:09 PM       POC Tests: No results for input(s): POCGLU, POCNA, POCK, POCCL, POCBUN, POCHEMO, POCHCT in the last 72 hours.     Coags: No results found for: PROTIME, INR, APTT    HCG (If Applicable): No results found for: PREGTESTUR, PREGSERUM, HCG, HCGQUANT     ABGs: No results found for: PHART, PO2ART, UXC8VET, XOM7YOI, BEART, A3SGFOUJ     Type & Screen (If Applicable):  No results found for: LABABO, LABRH    Drug/Infectious Status (If Applicable):  No results found for: HIV, HEPCAB    COVID-19 Screening (If Applicable):   Lab Results   Component Value Date/Time    COVID19 Not Detected 06/10/2021 02:09 PM    COVID19 Not Detected 03/11/2021 08:07 AM           Anesthesia Evaluation  Patient summary reviewed no history of anesthetic complications:   Airway: Mallampati: II  TM distance: >3 FB   Neck ROM: full  Mouth opening: > = 3 FB   Dental:          Pulmonary: breath sounds clear to auscultation  (+) asthma (Albuterol inhaler last used months ago.): seasonal asthma,                            Cardiovascular:  Exercise tolerance: poor (<4 METS),   (+) hypertension:, past MI (2010): > 6 months, CAD:, CABG/stent (Stent X1):, COSTA: after ambulating 1 flight of stairs, hyperlipidemia        Rhythm: regular  Rate: normal           Beta Blocker:  Dose within trace tricuspid regurgitation. Signature      ----------------------------------------------------------------   Electronically signed by Florentin Foreman DO(Interpreting   physician) on 02/15/2020 05:49 PM   ----------------------------------------------------------------     Neuro/Psych:   (+) neuromuscular disease ( Neuropathy):, psychiatric history:depression/anxiety              ROS comment: Neuropathy bilateral hands and feet. Wears hearing aid GI/Hepatic/Renal:   (+) GERD: well controlled,           Endo/Other:    (+) DiabetesType II DM, well controlled, , malignancy/cancer (Skin Cancer,  melanoma  legs   ). Pt had no PAT visit        ROS comment: Diet controlled diabetes Abdominal:             Vascular: negative vascular ROS. Other Findings:             Anesthesia Plan      MAC     ASA 3     (PIV# 22 Right FA)  Induction: intravenous. continuous noninvasive hemodynamic monitor  MIPS: Postoperative opioids intended and Prophylactic antiemetics administered. Anesthetic plan and risks discussed with patient. Plan discussed with CRNA.                     Marli Jackson MD  6-50-16

## 2022-08-15 ENCOUNTER — HOSPITAL ENCOUNTER (OUTPATIENT)
Age: 87
Setting detail: OUTPATIENT SURGERY
Discharge: HOME | End: 2022-08-15
Attending: PLASTIC SURGERY | Admitting: PLASTIC SURGERY
Payer: MEDICARE

## 2022-08-15 ENCOUNTER — ANESTHESIA (OUTPATIENT)
Dept: OPERATING ROOM | Age: 87
End: 2022-08-15
Payer: MEDICARE

## 2022-08-15 VITALS
BODY MASS INDEX: 27.48 KG/M2 | HEART RATE: 45 BPM | HEIGHT: 66 IN | OXYGEN SATURATION: 99 % | RESPIRATION RATE: 14 BRPM | SYSTOLIC BLOOD PRESSURE: 121 MMHG | TEMPERATURE: 96.8 F | WEIGHT: 171 LBS | DIASTOLIC BLOOD PRESSURE: 46 MMHG

## 2022-08-15 DIAGNOSIS — C44.729 SQUAMOUS CELL CARCINOMA OF LOWER LEG, LEFT: ICD-10-CM

## 2022-08-15 DIAGNOSIS — D09.9 SQUAMOUS CELL CARCINOMA IN SITU: Primary | ICD-10-CM

## 2022-08-15 PROCEDURE — 3600000003 HC SURGERY LEVEL 3 BASE: Performed by: PLASTIC SURGERY

## 2022-08-15 PROCEDURE — 6360000002 HC RX W HCPCS

## 2022-08-15 PROCEDURE — 88331 PATH CONSLTJ SURG 1 BLK 1SPC: CPT

## 2022-08-15 PROCEDURE — 7100000011 HC PHASE II RECOVERY - ADDTL 15 MIN: Performed by: PLASTIC SURGERY

## 2022-08-15 PROCEDURE — 3600000013 HC SURGERY LEVEL 3 ADDTL 15MIN: Performed by: PLASTIC SURGERY

## 2022-08-15 PROCEDURE — 2580000003 HC RX 258: Performed by: ANESTHESIOLOGY

## 2022-08-15 PROCEDURE — 2580000003 HC RX 258: Performed by: PLASTIC SURGERY

## 2022-08-15 PROCEDURE — 3700000001 HC ADD 15 MINUTES (ANESTHESIA): Performed by: PLASTIC SURGERY

## 2022-08-15 PROCEDURE — 6360000002 HC RX W HCPCS: Performed by: PLASTIC SURGERY

## 2022-08-15 PROCEDURE — 88305 TISSUE EXAM BY PATHOLOGIST: CPT

## 2022-08-15 PROCEDURE — 88332 PATH CONSLTJ SURG EA ADD BLK: CPT

## 2022-08-15 PROCEDURE — 3700000000 HC ANESTHESIA ATTENDED CARE: Performed by: PLASTIC SURGERY

## 2022-08-15 PROCEDURE — 7100000010 HC PHASE II RECOVERY - FIRST 15 MIN: Performed by: PLASTIC SURGERY

## 2022-08-15 PROCEDURE — 2709999900 HC NON-CHARGEABLE SUPPLY: Performed by: PLASTIC SURGERY

## 2022-08-15 RX ORDER — SODIUM CHLORIDE 9 MG/ML
INJECTION, SOLUTION INTRAVENOUS PRN
Status: DISCONTINUED | OUTPATIENT
Start: 2022-08-15 | End: 2022-08-15 | Stop reason: HOSPADM

## 2022-08-15 RX ORDER — SODIUM CHLORIDE, SODIUM LACTATE, POTASSIUM CHLORIDE, CALCIUM CHLORIDE 600; 310; 30; 20 MG/100ML; MG/100ML; MG/100ML; MG/100ML
INJECTION, SOLUTION INTRAVENOUS CONTINUOUS
Status: DISCONTINUED | OUTPATIENT
Start: 2022-08-15 | End: 2022-08-15 | Stop reason: HOSPADM

## 2022-08-15 RX ORDER — SODIUM CHLORIDE 9 MG/ML
INJECTION, SOLUTION INTRAVENOUS CONTINUOUS
Status: DISCONTINUED | OUTPATIENT
Start: 2022-08-15 | End: 2022-08-15 | Stop reason: HOSPADM

## 2022-08-15 RX ORDER — IPRATROPIUM BROMIDE AND ALBUTEROL SULFATE 2.5; .5 MG/3ML; MG/3ML
1 SOLUTION RESPIRATORY (INHALATION)
Status: CANCELLED | OUTPATIENT
Start: 2022-08-15 | End: 2022-08-15

## 2022-08-15 RX ORDER — PROPOFOL 10 MG/ML
INJECTION, EMULSION INTRAVENOUS CONTINUOUS PRN
Status: DISCONTINUED | OUTPATIENT
Start: 2022-08-15 | End: 2022-08-15 | Stop reason: SDUPTHER

## 2022-08-15 RX ORDER — HYDROMORPHONE HYDROCHLORIDE 1 MG/ML
0.25 INJECTION, SOLUTION INTRAMUSCULAR; INTRAVENOUS; SUBCUTANEOUS EVERY 5 MIN PRN
Status: CANCELLED | OUTPATIENT
Start: 2022-08-15

## 2022-08-15 RX ORDER — HYDRALAZINE HYDROCHLORIDE 20 MG/ML
10 INJECTION INTRAMUSCULAR; INTRAVENOUS
Status: CANCELLED | OUTPATIENT
Start: 2022-08-15

## 2022-08-15 RX ORDER — SODIUM CHLORIDE 9 MG/ML
INJECTION, SOLUTION INTRAVENOUS PRN
Status: CANCELLED | OUTPATIENT
Start: 2022-08-15

## 2022-08-15 RX ORDER — PROCHLORPERAZINE EDISYLATE 5 MG/ML
5 INJECTION INTRAMUSCULAR; INTRAVENOUS
Status: CANCELLED | OUTPATIENT
Start: 2022-08-15 | End: 2022-08-15

## 2022-08-15 RX ORDER — DIPHENHYDRAMINE HYDROCHLORIDE 50 MG/ML
12.5 INJECTION INTRAMUSCULAR; INTRAVENOUS
Status: CANCELLED | OUTPATIENT
Start: 2022-08-15 | End: 2022-08-15

## 2022-08-15 RX ORDER — LABETALOL HYDROCHLORIDE 5 MG/ML
10 INJECTION, SOLUTION INTRAVENOUS
Status: CANCELLED | OUTPATIENT
Start: 2022-08-15

## 2022-08-15 RX ORDER — SODIUM CHLORIDE 0.9 % (FLUSH) 0.9 %
5-40 SYRINGE (ML) INJECTION EVERY 12 HOURS SCHEDULED
Status: CANCELLED | OUTPATIENT
Start: 2022-08-15

## 2022-08-15 RX ORDER — ONDANSETRON 2 MG/ML
INJECTION INTRAMUSCULAR; INTRAVENOUS PRN
Status: DISCONTINUED | OUTPATIENT
Start: 2022-08-15 | End: 2022-08-15 | Stop reason: SDUPTHER

## 2022-08-15 RX ORDER — FENTANYL CITRATE 50 UG/ML
INJECTION, SOLUTION INTRAMUSCULAR; INTRAVENOUS PRN
Status: DISCONTINUED | OUTPATIENT
Start: 2022-08-15 | End: 2022-08-15 | Stop reason: SDUPTHER

## 2022-08-15 RX ORDER — MIDAZOLAM HYDROCHLORIDE 1 MG/ML
INJECTION INTRAMUSCULAR; INTRAVENOUS PRN
Status: DISCONTINUED | OUTPATIENT
Start: 2022-08-15 | End: 2022-08-15 | Stop reason: SDUPTHER

## 2022-08-15 RX ORDER — SODIUM CHLORIDE 0.9 % (FLUSH) 0.9 %
5-40 SYRINGE (ML) INJECTION EVERY 12 HOURS SCHEDULED
Status: DISCONTINUED | OUTPATIENT
Start: 2022-08-15 | End: 2022-08-15 | Stop reason: HOSPADM

## 2022-08-15 RX ORDER — SODIUM CHLORIDE 0.9 % (FLUSH) 0.9 %
5-40 SYRINGE (ML) INJECTION PRN
Status: DISCONTINUED | OUTPATIENT
Start: 2022-08-15 | End: 2022-08-15 | Stop reason: HOSPADM

## 2022-08-15 RX ORDER — SODIUM CHLORIDE 0.9 % (FLUSH) 0.9 %
5-40 SYRINGE (ML) INJECTION PRN
Status: CANCELLED | OUTPATIENT
Start: 2022-08-15

## 2022-08-15 RX ADMIN — SODIUM CHLORIDE, POTASSIUM CHLORIDE, SODIUM LACTATE AND CALCIUM CHLORIDE: 600; 310; 30; 20 INJECTION, SOLUTION INTRAVENOUS at 09:50

## 2022-08-15 RX ADMIN — PROPOFOL INJECTABLE EMULSION 50 MCG/KG/MIN: 10 INJECTION, EMULSION INTRAVENOUS at 10:15

## 2022-08-15 RX ADMIN — MIDAZOLAM 0.5 MG: 1 INJECTION INTRAMUSCULAR; INTRAVENOUS at 10:28

## 2022-08-15 RX ADMIN — FENTANYL CITRATE 25 MCG: 50 INJECTION INTRAMUSCULAR; INTRAVENOUS at 10:18

## 2022-08-15 RX ADMIN — VANCOMYCIN HYDROCHLORIDE 1000 MG: 1 INJECTION, POWDER, LYOPHILIZED, FOR SOLUTION INTRAVENOUS at 10:18

## 2022-08-15 RX ADMIN — SODIUM CHLORIDE, POTASSIUM CHLORIDE, SODIUM LACTATE AND CALCIUM CHLORIDE: 600; 310; 30; 20 INJECTION, SOLUTION INTRAVENOUS at 10:08

## 2022-08-15 RX ADMIN — ONDANSETRON 4 MG: 2 INJECTION INTRAMUSCULAR; INTRAVENOUS at 10:33

## 2022-08-15 RX ADMIN — FENTANYL CITRATE 25 MCG: 50 INJECTION INTRAMUSCULAR; INTRAVENOUS at 10:27

## 2022-08-15 RX ADMIN — MIDAZOLAM 0.5 MG: 1 INJECTION INTRAMUSCULAR; INTRAVENOUS at 10:18

## 2022-08-15 RX ADMIN — MIDAZOLAM 1 MG: 1 INJECTION INTRAMUSCULAR; INTRAVENOUS at 10:11

## 2022-08-15 ASSESSMENT — ENCOUNTER SYMPTOMS: DYSPNEA ACTIVITY LEVEL: AFTER AMBULATING 1 FLIGHT OF STAIRS

## 2022-08-15 ASSESSMENT — PAIN - FUNCTIONAL ASSESSMENT: PAIN_FUNCTIONAL_ASSESSMENT: 0-10

## 2022-08-15 NOTE — H&P
HISTORY AND PHYSICAL             Date: 8/15/2022        Patient Name: Moses Sutton     YOB: 1935      Age:  81 y.o. 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 This patient was referred to me by her dermatologist.  The patient has had multiple skin cancers in the past.  When I recently saw the patient in the office she had had 2 biopsies performed: 1 on each lower leg. Both pathology reports indicated cutaneous squamous cell carcinomas.     Past Medical History     Past Medical History:   Diagnosis Date    Asthma     CAD (coronary artery disease)     Cancer ([address]     melanoma  legs    Diabetes mellitus ([address]     Gastroesophageal reflux disease without esophagitis     H/O cardiovascular stress test 02/14/2020    Heart attack ([address] 01/2013    Hyperlipidemia     Hypertension     Neuropathy     Wears hearing aid         Past Surgical History     Past Surgical History:   Procedure Laterality Date    BACK SURGERY  2007    lumbar    BLADDER SUSPENSION  2002    COLONOSCOPY      CORONARY ANGIOPLASTY WITH STENT PLACEMENT  01/2013    3.0 x 20 mm in LAD   x1 stent    DIAGNOSTIC CARDIAC CATH LAB PROCEDURE      EYE SURGERY      cataract ghada eyes    HYSTERECTOMY (CERVIX STATUS UNKNOWN)  1983    JOINT REPLACEMENT Bilateral     knees    KNEE SURGERY Bilateral     LEG BIOPSY EXCISION Left 03/16/2021    EXCISION LEFT LATERAL LEG SQUAMOUS CELL CARCINOMA, FULL THICKNESS SKIN GRAFT --EXCISIONAL BIOPSY WITH COMPLEX CLOSURE performed by Chelsey Navarro MD at [facility] [address] [facility] [address] Right 09/02/2021    EXCISION SQUAMOUS CELL CARCINOMA RIGHT ANTERIOR LEG, FROZEN, performed by Chelsey Navarro MD at [facility]      Dr. Eunice Faith and Dr. Susan Abbott (twice)   melanoma    TOTAL HIP ARTHROPLASTY Bilateral     left 1999 & revision 2007 & right 2001        Medications Prior to Admission     Prior to Admission medications    Medication Sig Start Date End Date Taking? Authorizing Provider   vitamin E 1000 units capsule Take 1,000 Units by mouth in the morning. Yes Historical Provider, MD   Multiple Vitamins-Minerals (CENTRUM SILVER PO) Take by mouth daily   Yes Historical Provider, MD   clopidogrel (PLAVIX) 75 MG tablet Take 75 mg by mouth in the morning. Stopped 5 days pre op. Yes Historical Provider, MD   Multiple Vitamins-Minerals (AIRBORNE GUMMIES PO) Take by mouth    Historical Provider, MD   rosuvastatin (CRESTOR) 10 MG tablet Take 20 mg by mouth in the morning. 7/12/21   Historical Provider, MD   calcium citrate-vitamin D (CITRICAL + D) 315-250 MG-UNIT TABS per tablet Take 1 tablet by mouth 2 times daily (with meals)    Historical Provider, MD   lisinopril (PRINIVIL;ZESTRIL) 10 MG tablet Take 10 mg by mouth in the morning and 10 mg before bedtime. Historical Provider, MD   Omeprazole 20 MG TBDD Take 20 mg by mouth daily AM    Historical Provider, MD   gabapentin (NEURONTIN) 600 MG tablet Take 600 mg by mouth in the morning and 600 mg before bedtime. Takes 2 tabs bid. Historical Provider, MD   alendronate (FOSAMAX) 70 MG tablet Take 70 mg by mouth Once a week. 12/11/12   Historical Provider, MD   metoprolol (TOPROL-XL) 25 MG XL tablet Take 25 mg by mouth in the morning and 25 mg before bedtime.  AM. 1/16/13   Historical Provider, MD        Allergies   Ketamine, Feldene [piroxicam], Oxycontin [oxycodone hcl], Pcn [penicillins], and Sulfa antibiotics    Social History     Social History       Tobacco History       Smoking Status  Never      Smokeless Tobacco Use  Never              Alcohol History       Alcohol Use Status  Not Currently              Drug Use       Drug Use Status  No              Sexual Activity       Sexually Active  Not Asked                    Family History     Family History   Problem Relation Age of Onset    No Known Problems Mother     Heart Attack Father        Review of Systems   Review of Systems    Physical Exam   Ht 5' 6\" (1.676 m)   Wt 171 lb (77.6 kg)   BMI 27.60 kg/m²     Physical Exam  Heart: Regular rate and rhythm  Lungs: Clear to auscultation bilaterally  On the left anterior distal lower leg is a 2 x 1 cm scar. On the right medial distal lower leg there appears to be residual cutaneous carcinoma measuring 2 x 1.5 cm. Labs    No results found for this or any previous visit (from the past 24 hour(s)). Imaging/Diagnostics Last 24 Hours   No results found. Assessment      Cutaneous squamous cell carcinoma Bilateral lower legs. Plan   The previous location of the squamous cell carcinoma on the left leg appears to have healed. There only appears to be residual scar in the area. On the right lower medial leg there appears to be some persistence of the biopsy-proven cutaneous squamous cell carcinoma. Therefore today we will change the plan and excise the area on the right lower leg. This was explained to both the patient and the patient's daughter in the preoperative holding area.       Consultations Ordered:  None    Electronically signed by Jose Manuel Ramirez MD on 8/15/22 at 6:42 AM EDT

## 2022-08-15 NOTE — OP NOTE
Operative Note      Patient: Tamir Kan  YOB: 1935  MRN: [id number]    Date of Procedure: 8/15/2022    Pre-Op Diagnosis: Squamous cell carcinoma of the right lower leg    Post-Op Diagnosis: Same       Procedure #1: Excision of cutaneous squamous cell carcinoma right lower leg measuring 2 x 2 cm with frozen section control    Procedure #2: Bilateral rotation flap closure right lower leg skin cancer defect measuring 12 cm²    Surgeon(s):  Xiao Hi MD    Assistant:   * No surgical staff found *    Anesthesia: Monitor Anesthesia Care    Estimated Blood Loss (mL): Minimal    Complications: None    Specimens:   ID Type Source Tests Collected by Time Destination   A : Squamous Cell Carcinoma Right Lower Leg (Stitch at 12 o'clock) Tissue Tissue SURGICAL PATHOLOGY Xiao Hi MD 8/15/2022 1029        Implants:  * No implants in log *      Drains: * No LDAs found *    Findings: Frozen section exam was performed intraoperatively and indicated clear margins    Detailed Description of Procedure: This is a 81-year-old female with a history of multiple skin cancers in the past.  Most recently she was referred to my office for biopsy-positive squamous cell carcinomas of both the right lower leg and the left lower leg. Initially we were planning on removing the squamous cell carcinoma from the left lower leg however in the preoperative holding area we noted that there was no gross remnant of the lesion and therefore in front of the patient and the patient's daughter we decided to excise the biopsy-proven cutaneous squamous cell carcinoma of the right lower leg. More specifically this was located on the distal and medial aspect of the right lower leg. This falls within an old scar. Risks benefits and alternatives of excision of squamous cell carcinoma right lower leg with frozen section control and local reconstruction were explained to the patient and consent was obtained.   Patient received a dose of preoperative IV antibiotics. She was then brought back to the operating room and placed in the supine position where MAC anesthesia was initiated. The entire right leg was then prepped and draped in a circumferential fashion. Under loupe magnification I marked out the pattern for excision such that there was a narrow border of normal-appearing skin in all directions. A combination 1% lidocaine with epinephrine mixed with half percent bupivacaine was then injected locally. Incisions were made along our markings. I encountered a significant amount of scar. Sharp dissection proceeded down into the subcutaneous tissues. The specimen was marked in the 12 o'clock position with a suture and completely excised. Frozen section examination was performed and indicated that our margins were clear. Generous wide undermining was then performed in all directions. To opposing rotation flaps were marked. Additional local anesthetic was injected. Incisions were then performed along these markings. Sharp dissection proceeded down into the subcutaneous tissue to elevate to opposing adipose cutaneous rotation flaps. They were rotated into position, trimmed, and inset in a layered fashion with 3-0 Monocryl and 3-0 nylon. The dimension of both flaps plus the defect measured 12 cm². Sterile dressings were then applied. The patient tolerated the procedure well and was transferred to recovery in stable condition.     Electronically signed by America Penaloza MD on 8/15/2022 at 11:22 AM

## 2022-08-15 NOTE — DISCHARGE INSTRUCTIONS
Keep right leg elevated is much as possible while at rest.  Keep dressings in place. Tylenol and ibuprofen for pain. Follow-up with me tomorrow. Nausea and Vomiting After Surgery: Care Instructions  Your Care Instructions     After you've had surgery, you may feel sick to your stomach (nauseated) or you may vomit. Sometimes anesthesia can make you feel sick. It's a common side effect and often doesn't last long. Pain also can make you feel sick or vomit. After the anesthesia wears off, you may feel pain from the incision (cut). That pain could then upset your stomach. Taking pain medicine can also make you feelsick to your stomach. Whatever the cause, you may get medicine that can help. There are also somethings you can do at home to prevent nausea and feel better. The doctor has checked you carefully, but problems can develop later. If you notice any problems or new symptoms, get medical treatment right away. Follow-up care is a key part of your treatment and safety. Be sure to make and go to all appointments, and call your doctor if you are having problems. It's also a good idea to know your test results and keep alist of the medicines you take. How can you care for yourself at home? Be safe with medicines. Read and follow all instructions on the label. If the doctor gave you a prescription medicine for pain, take it as prescribed. If you are not taking a prescription pain medicine, ask your doctor if you can take an over-the-counter medicine. Take your pain medicine as soon as you have pain. It works better if you take it before the pain gets bad. Call your doctor if you have any problems with your medicine. Rest in bed until you feel better. To prevent dehydration, drink plenty of fluids. Choose water and other clear liquids until you feel better.  If you have kidney, heart, or liver disease and have to limit fluids, talk with your doctor before you increase the amount of fluids you drink.  When you are able to eat, try clear soups, mild foods, and liquids until all symptoms are gone for 12 to 48 hours. Other good choices include dry toast, crackers, cooked cereal, and gelatin dessert, such as Jell-O. Do not smoke. Smoking and being around smoke can make nausea worse. If you need help quitting, talk to your doctor about stop-smoking programs and medicines. These can increase your chances of quitting for good. When should you call for help? Call 911  anytime you think you may need emergency care. For example, call if:    You passed out (lost consciousness). Call your doctor now or seek immediate medical care if:    You have new or worse nausea or vomiting. You are too sick to your stomach to drink any fluids. You cannot keep down fluids. You have symptoms of dehydration, such as:  Dry eyes and a dry mouth. Passing only a little urine. Feeling thirstier than usual.     Your pain medicine is not helping. You are dizzy or lightheaded, or you feel like you may faint. Watch closely for changes in your health, and be sure to contact your doctor if:    You do not get better as expected. Where can you learn more? Go to https://CellControlpeMosa Recordseb.[email or web address] org and sign in to your Upverter account. Enter M536 in the [facility] box to learn more about \"Nausea and Vomiting After Surgery: Care Instructions. \"     If you do not have an account, please click on the \"Sign Up Now\" link. Current as of: March 9, 2022               Content Version: 13.3  © 2006-2022 Healthwise, [employer]. Care instructions adapted under license by [facility] ([facility]). If you have questions about a medical condition or this instruction, always ask your healthcare professional. [place] [address] any warranty or liability for your use of this information. Infection After Surgery: Care Instructions  Overview  After surgery, an infection is always possible.  It doesn't mean that thesurgery didn't go well. Because an infection can be serious, your doctor has taken steps to manage it. Your doctor checked the infection and cleaned it if necessary. Your doctor may have made an opening in the area so that the pus can drain out. You may have gauze in the cut so that the area will stay open and keep draining. You mayneed antibiotics. You will need to follow up with your doctor to make sure the infection has goneaway. Follow-up care is a key part of your treatment and safety. Be sure to make and go to all appointments, and call your doctor if you are having problems. It's also a good idea to know your test results and keep alist of the medicines you take. How can you care for yourself at home? Make sure your surgeon knows about the infection, especially if you saw another doctor about your symptoms. If your doctor prescribed antibiotics, take them as directed. Do not stop taking them just because you feel better. You need to take the full course of antibiotics. Ask your doctor if you can take an over-the-counter pain medicine, such as acetaminophen (Tylenol), ibuprofen (Advil, Motrin), or naproxen (Aleve). Be safe with medicines. Read and follow all instructions on the label. Do not take two or more pain medicines at the same time unless the doctor told you to. Many pain medicines have acetaminophen, which is Tylenol. Too much acetaminophen (Tylenol) can be harmful. Prop up the area on a pillow anytime you sit or lie down during the next 3 days. Try to keep it above the level of your heart. This will help reduce swelling. Keep the skin clean and dry. You may have a bandage over the cut (incision). A bandage helps the incision heal and protects it. Your doctor will tell you how to take care of this. Keep it clean and dry. You may have drainage from the wound. If your doctor told you how to care for your incision, follow your doctor's instructions.  If you did not get instructions, follow this general advice:  Wash around the incision with clean water 2 times a day. Don't use hydrogen peroxide or alcohol, which can slow healing. When should you call for help? Call your doctor now or seek immediate medical care if:    You have signs that your infection is getting worse, such as: Increased pain, swelling, warmth, or redness in the area. Red streaks leading from the area. Pus draining from the wound. A new or higher fever. Watch closely for changes in your health, and be sure to contact your doctor ifyou have any problems. Where can you learn more? Go to https://DE Spirits.AlienVault. org and sign in to your Atterocor account. Enter C340 in the [employer] [facility] box to learn more about \"Infection After Surgery: Care Instructions. \"     If you do not have an account, please click on the \"Sign Up Now\" link. Current as of: January 20, 2022               Content Version: 13.3  © 2006-2022 Healthwise, Incorporated. Care instructions adapted under license by [facility] ([facility]). If you have questions about a medical condition or this instruction, always ask your healthcare professional. [place] [address] any warranty or liability for your use of this information.

## 2023-03-06 ENCOUNTER — APPOINTMENT (OUTPATIENT)
Dept: CT IMAGING | Age: 88
End: 2023-03-06
Payer: MEDICARE

## 2023-03-06 ENCOUNTER — HOSPITAL ENCOUNTER (EMERGENCY)
Age: 88
Discharge: HOME | End: 2023-03-06
Attending: EMERGENCY MEDICINE
Payer: MEDICARE

## 2023-03-06 VITALS
WEIGHT: 172 LBS | RESPIRATION RATE: 18 BRPM | SYSTOLIC BLOOD PRESSURE: 220 MMHG | DIASTOLIC BLOOD PRESSURE: 110 MMHG | BODY MASS INDEX: 27.76 KG/M2

## 2023-03-06 DIAGNOSIS — R93.89 ABNORMAL CT SCAN: ICD-10-CM

## 2023-03-06 DIAGNOSIS — M25.552 LEFT HIP PAIN: Primary | ICD-10-CM

## 2023-03-06 PROCEDURE — 99284 EMERGENCY DEPT VISIT MOD MDM: CPT

## 2023-03-06 PROCEDURE — 73700 CT LOWER EXTREMITY W/O DYE: CPT

## 2023-03-06 ASSESSMENT — ENCOUNTER SYMPTOMS
ABDOMINAL PAIN: 0
COUGH: 0
SHORTNESS OF BREATH: 0

## 2023-03-06 ASSESSMENT — PAIN - FUNCTIONAL ASSESSMENT: PAIN_FUNCTIONAL_ASSESSMENT: 0-10

## 2023-03-06 NOTE — ED PROVIDER NOTES
This is an 81-year-old female with a past medical history of coronary artery disease who presents to the ED for evaluation of hip pain. Patient states she had a fall approximately 1 week ago after she tripped and fell on her left side. Patient states she never did hit her head nor did she pass out. Patient has been ambulatory since the incident. Patient states however whenever she does go to move have some pain to her left hip which prompted her to go to the urgent care yesterday. Patient was told that she had abnormal x-rays yesterday and was advised to come to the ER for further evaluation. Patient has no open wound or drainage from the left hip. Once again the patient is ambulating on its and states that she does feel some pain while she does this. No other reported mitigating or exacerbating factors. The history is provided by the patient. No  was used. Review of Systems   Constitutional:  Negative for fever. HENT:  Negative for congestion. Eyes:  Negative for visual disturbance. Respiratory:  Negative for cough and shortness of breath. Cardiovascular:  Negative for chest pain. Gastrointestinal:  Negative for abdominal pain. Endocrine: Negative for polyuria. Genitourinary:  Negative for dysuria. Musculoskeletal:  Positive for gait problem. Skin:  Negative for rash. Allergic/Immunologic: Negative for immunocompromised state. Neurological:  Negative for headaches. Hematological:  Does not bruise/bleed easily. Psychiatric/Behavioral:  Negative for confusion. Physical Exam  Vitals and nursing note reviewed. Constitutional:       General: She is not in acute distress. Appearance: She is well-developed. HENT:      Head: Normocephalic and atraumatic. Mouth/Throat:      Mouth: Mucous membranes are moist.   Eyes:      Extraocular Movements: Extraocular movements intact. Pupils: Pupils are equal, round, and reactive to light.    Neck: Vascular: No JVD. Cardiovascular:      Rate and Rhythm: Normal rate and regular rhythm. Pulmonary:      Effort: Pulmonary effort is normal.      Breath sounds: No wheezing, rhonchi or rales. Chest:      Chest wall: No tenderness. Abdominal:      General: There is no distension. Palpations: Abdomen is soft. Tenderness: There is no abdominal tenderness. There is no guarding or rebound. Hernia: No hernia is present. Musculoskeletal:      Cervical back: Normal range of motion and neck supple. Right lower leg: No edema. Left lower leg: No edema. Comments: Normal gait, normal straight leg raise, nontender left hip, pulses intact compartments soft and compressible   Skin:     General: Skin is warm and dry. Capillary Refill: Capillary refill takes less than 2 seconds. Neurological:      General: No focal deficit present. Mental Status: She is alert and oriented to person, place, and time. Cranial Nerves: No cranial nerve deficit. Psychiatric:         Mood and Affect: Mood normal.         Behavior: Behavior normal.        Procedures     MDM  Number of Diagnoses or Management Options  Abnormal CT scan  Left hip pain  Diagnosis management comments: This is a pleasant 73-year-old female who has had multiple hip revisions had a fall about 1 week ago where she landed on her left hip and has been having some pain ever she goes to stand up. Patient apparently was seen at a local urgent care and was she had abnormal x-rays and was sent in for further evaluation. Patient had a broad range listed below but was not limited to sudden patient had amatory no fevers or any signs of systemic infection making a septic hip seem unlikely.   I did get CT imaging did discuss the orthopedic residents who stated given the patient has been ambulatory these more than likely are chronic changes did recommend the patient use some form of walking assistance and is to follow-up with the next several days with her orthopedic specialist.  Patient was agreeable plan given return precautions                 Differential diagnosis: Fracture, Dislocation, Septic Joint  Review of ED/ Outpatient Records: Reviewed Dr. Pace Guardian note  Historians that case was discussed with: Daughter  My EKG interpretation: None  Imaging interpretation by myself: None  Independent Interpretation of labs: None  Discussed with other providers: Spoke with orthopedic resident  Tests Considered but not ordered: None  Decision making tools/risks stratification: None  Disposition decision making/shared decision making: Shared  Chronic Conditions affecting care: Previous Hip Surgery  Social Determinants of health impacting treatment or disposition: None  CODE status and Discussions: FUll                    --------------------------------------------- PAST HISTORY ---------------------------------------------  Past Medical History:  has a past medical history of Asthma, CAD (coronary artery disease), Cancer ([facility] [address] Diabetes mellitus ([facility] [address] Gastroesophageal reflux disease without esophagitis, H/O cardiovascular stress test, Heart attack ([facility] [address] Hyperlipidemia, Hypertension, Neuropathy, and Wears hearing aid. Past Surgical History:  has a past surgical history that includes Total hip arthroplasty (Bilateral); knee surgery (Bilateral); back surgery (2007); Hysterectomy (1983); bladder suspension (2002); Coronary angioplasty with stent (01/2013); Diagnostic Cardiac Cath Lab Procedure; malignant skin lesion excision; Leg biopsy excision (Left, 03/16/2021); Colonoscopy; eye surgery; Leg biopsy excision (Right, 09/02/2021); joint replacement (Bilateral); and Neck surgery (Right, 8/15/2022). Social History:  reports that she has never smoked. She has never used smokeless tobacco. She reports that she does not currently use alcohol. She reports that she does not use drugs.     Family History: family history includes Heart Attack in her father; No Known Problems in her mother. The patients home medications have been reviewed. Allergies: Ketamine, Feldene [piroxicam], Oxycontin [oxycodone hcl], Pcn [penicillins], and Sulfa antibiotics    -------------------------------------------------- RESULTS -------------------------------------------------  Labs:  No results found for this visit on 03/06/23. Radiology:  CT HIP LEFT WO CONTRAST   Final Result   1. Motion and metallic artifact seen at level of left hip arthroplasty. No   obvious dislocation of femoral and acetabular components. Radiographs of   left hip recommended for further evaluation. 2. Fracture is seen involving medial wall of left acetabulum which appears   chronic with minimal acetabular protrusion. 3. Additional cortical irregularity involving  proximal femoral diaphysis   also likely related to chronic fracture. 4. No obvious fracture or dislocation involving mid or distal left femur. 5. Satisfactory position of left knee arthroplasty. CT FEMUR LEFT WO CONTRAST   Final Result   1. Motion and metallic artifact seen at level of left hip arthroplasty. No   obvious dislocation of femoral and acetabular components. Radiographs of   left hip recommended for further evaluation. 2. Fracture is seen involving medial wall of left acetabulum which appears   chronic with minimal acetabular protrusion. 3. Additional cortical irregularity involving  proximal femoral diaphysis   also likely related to chronic fracture. 4. No obvious fracture or dislocation involving mid or distal left femur. 5. Satisfactory position of left knee arthroplasty. ------------------------- NURSING NOTES AND VITALS REVIEWED ---------------------------  Date / Time Roomed:  3/6/2023  6:02 PM  ED Bed Assignment:  19/19    The nursing notes within the ED encounter and vital signs as below have been reviewed.    BP (!) 220/110   Resp 18   Wt 172 lb (78 kg)   BMI 27.76 kg/m² Oxygen Saturation Interpretation: Normal      ------------------------------------------ PROGRESS NOTES ------------------------------------------  6:21 PM EST  I have spoken with the patient and discussed todays results, in addition to providing specific details for the plan of care and counseling regarding the diagnosis and prognosis. Their questions are answered at this time and they are agreeable with the plan. I discussed at length with them reasons for immediate return here for re evaluation. They will followup with their orthopedic specialist      --------------------------------- ADDITIONAL PROVIDER NOTES ---------------------------------  At this time the patient is without objective evidence of an acute process requiring hospitalization or inpatient management. They have remained hemodynamically stable throughout their entire ED visit and are stable for discharge with outpatient follow-up. The plan has been discussed in detail and they are aware of the specific conditions for emergent return, as well as the importance of follow-up. Discharge Medication List as of 3/6/2023  9:45 PM          Diagnosis:  1. Left hip pain    2. Abnormal CT scan        Disposition:  Patient's disposition: Discharge to home  Patient's condition is stable.         Cindi Moran DO  03/08/23 [id number]

## 2023-03-07 NOTE — ED NOTES
Pt given d/c instructions and was able to verbalize understanding. Pt ambulatory out of department.       Garrison Kapoor RN  03/06/23 [id number]

## 2023-04-03 NOTE — TELEPHONE ENCOUNTER
Called patient to check if 2/18/21 can be a new date for procedure patient stated daughter is off work for the 3/16/2021 will keep this date. Last visit: 10/2022    History of present illness:  Negar Abreu is a 69 year old female returning for asthma and allergic rhinitis.  On the last visit, she was stepped up on symbicort to 160/4.5 mcg 2 puffs BID, and continued on claritin 10 mg twice daily, flonase 2 sprays EN Daily prn, and prn pataday. Flovent was discontinued. She was also started on oral steroids for acute bronchitis. Since Negar Abreu was last seen, she has been mostly well but still occasionally feels chest tightness at night when laying down and with temperature fluctuations. She uses albuterol and this helps. She has not noted significant change with stepping up to symbicort. She has not required oral steroids since last seen. She did get azithromycin for acute bronchitis.     She denies persistent nasal congestion or drainage. No recent sinus infections.     She continues to avoid tree nuts and peanut. Is not interested in challenging.     Allergies:  Negar Abreu is allergic to macadamia nut oil   (food or med), sulfa antibiotics, valacyclovir, skin adhesives, and valacyclovir hcl.    Medications:  Current Outpatient Medications   Medication Sig Dispense Refill   • aspirin 81 MG chewable tablet Chew 81 mg by mouth daily.     • fluticasone-umeclidin-vilanterol (TRELEGY ELLIPTA) 200-62.5-25 MCG/ACT inhaler Inhale 1 puff into the lungs daily. 1 each 5   • albuterol 108 (90 Base) MCG/ACT inhaler Inhale 2 puffs into the lungs every 4 hours as needed for Shortness of Breath, Wheezing or Other (Cough). 1 each 1   • sertraline (ZOLOFT) 100 MG tablet      • gabapentin (NEURONTIN) 300 MG capsule TAKE 1 CAPSULE BY MOUTH TWICE DAILY     • EPINEPHrine 0.3 MG/0.3ML auto-injector INJECT 0.3ML IN THE MUSCLE ONCE FOR 1 DOSE FOR ANAPHYLAXIS.     • Cholecalciferol 125 mcg (5,000 units) tablet Take 5,000 Units by mouth.     • amLODIPine (NORVASC) 5 MG tablet TK 1 T PO QD  4   • ALPRAZolam (XANAX) 0.5 MG tablet TK 1 T PO BID  0   • metoPROLOL  succinate (TOPROL-XL) 50 MG 24 hr tablet        No current facility-administered medications for this visit.       Past Medical History:  Patient Active Problem List   Diagnosis   • Anxiety associated with depression   • Hiatal hernia   • HTN (hypertension)   • Hyperlipidemia   • Midline low back pain without sciatica       Family History:  Family changes since last visit: No      Social History:  --Occupation/School changes since last visit :no   --Smoking habit/exposure changes since last visit :no   --Residency changes since last visit :no   --New pets since last visit :no       REVIEW OF SYSTEMS    Review of Systems   Constitutional: Negative for appetite change and fever.   HENT: Negative for congestion, ear pain, facial swelling, sinus pressure, sinus pain, sneezing and sore throat.    Respiratory: Positive for cough and chest tightness. Negative for shortness of breath and wheezing.    Cardiovascular: Negative for chest pain.   Gastrointestinal: Negative for abdominal pain, diarrhea and vomiting.   Genitourinary: Negative for dysuria.   Musculoskeletal: Negative for arthralgias and joint swelling.   Skin: Negative for rash.   Allergic/Immunologic: Positive for environmental allergies. Negative for immunocompromised state.   Neurological: Negative for headaches.   Hematological: Negative for adenopathy.   Psychiatric/Behavioral: Negative for sleep disturbance. The patient is not nervous/anxious.      PHYSICAL EXAM  Visit Vitals  /84   Pulse 68   Temp 97.2 °F (36.2 °C) (Temporal)   Ht 5' 5.5\" (1.664 m)   Wt 88.5 kg (195 lb)   SpO2 97%   BMI 31.96 kg/m²        Physical Exam  Constitutional:       General: She is not in acute distress.     Appearance: She is well-developed.   HENT:      Head: Normocephalic.      Right Ear: Tympanic membrane, ear canal and external ear normal.      Left Ear: Tympanic membrane, ear canal and external ear normal.      Nose: Nose normal.      Mouth/Throat:      Pharynx: No  oropharyngeal exudate.   Eyes:      Pupils: Pupils are equal, round, and reactive to light.   Cardiovascular:      Rate and Rhythm: Normal rate and regular rhythm.      Heart sounds: Normal heart sounds. No murmur heard.  Pulmonary:      Effort: Pulmonary effort is normal. No respiratory distress.      Breath sounds: Normal breath sounds. No stridor. No wheezing or rales.   Abdominal:      Palpations: Abdomen is soft. There is no mass.      Tenderness: There is no abdominal tenderness.   Musculoskeletal:         General: Normal range of motion.      Cervical back: Normal range of motion.   Lymphadenopathy:      Cervical: No cervical adenopathy.   Skin:     General: Skin is warm.      Findings: No erythema or rash.   Neurological:      Mental Status: She is alert and oriented to person, place, and time.           Labs/Tests:  NA    Assessment and Plan:    Allergic/Mixed Rhinitis; Dry eye syndrome  Summary: Immunocap elevated to tree pollen and cat 7/2021. No pets at home.       Doing well presently. Has cataracts and is planning on getting these removed.      • Continue fluticasone (Flonase) 2 sprays in each nostril daily as needed.   • Continue claritin 10 mg daily.   • Continue pataday 1 drop in each eye daily as needed for itchy/watery eyes.   • Continue dry eye drops (eg. systane, Genteal) twice daily as needed.   • See ophthalmology.      Moderate persistent asthma  Summary: Suspected covid in early 2020 and has some intermittent chest tightness. Oral steroids 4/2022, 8/2022, 10/2022 for acute bronchitis/asthma exacerbation required step up in maintenance inhaler from flovent to symbicort.        Stepped up to Symbicort has not improved symptoms overall.  Required oral antibiotics for bronchitis 12/2022.      • Step up to Trelegy 200/62.5/25 mcg 1 puff daily. Rinse mouth after use.   • Continue albuterol 2 puffs or symbicort 160/4.5 mcg every 4 hours as needed.   • Consider f/u with pulmonology for sleep study.    • Check spirometry at follow up.      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 Immunocap to tree nuts showed elevation to hazelnut alone (related to birch tree pollen allergy), negative to remaining tree nuts including macadamia nut 7/2021. On 6/26 Negar ate macadamia nuts, which shes had before but these came from [place]. Within 20 minutes she developed facial swelling, throat itching, and mild chest tightness.     She did try pistachio and felt some mouth itching.      • Continue loratadine (Claritin) 10 mg daily. May step up to 20 mg (2 tabs) twice daily with any rashes or swelling.   • Contact me with any recurrent swelling.   • Discussed that it is ok to reintroduce nuts. If not comfortable can do an in office oral challenge.   • Epipen is up to date.      Follow up in 3 month(s)    Matilde Fritz, MESFIN, FNP-C

## 2023-06-29 ENCOUNTER — HOSPITAL ENCOUNTER (OUTPATIENT)
Dept: WOUND CARE | Age: 88
Discharge: HOME | End: 2023-06-29
Payer: MEDICARE

## 2023-06-29 VITALS
SYSTOLIC BLOOD PRESSURE: 175 MMHG | DIASTOLIC BLOOD PRESSURE: 84 MMHG | WEIGHT: 175 LBS | HEART RATE: 69 BPM | BODY MASS INDEX: 28.12 KG/M2 | RESPIRATION RATE: 16 BRPM | TEMPERATURE: 97.8 F | HEIGHT: 66 IN

## 2023-06-29 DIAGNOSIS — E08.621 DIABETIC ULCER OF TOE OF RIGHT FOOT ASSOCIATED WITH DIABETES MELLITUS DUE TO UNDERLYING CONDITION, WITH FAT LAYER EXPOSED (HCC): ICD-10-CM

## 2023-06-29 DIAGNOSIS — L97.512 DIABETIC ULCER OF TOE OF RIGHT FOOT ASSOCIATED WITH DIABETES MELLITUS DUE TO UNDERLYING CONDITION, WITH FAT LAYER EXPOSED (HCC): ICD-10-CM

## 2023-06-29 DIAGNOSIS — L97.509 DIABETES MELLITUS DUE TO UNDERLYING CONDITION WITH FOOT ULCER, WITHOUT LONG-TERM CURRENT USE OF INSULIN (HCC): ICD-10-CM

## 2023-06-29 DIAGNOSIS — E08.621 DIABETES MELLITUS DUE TO UNDERLYING CONDITION WITH FOOT ULCER, WITHOUT LONG-TERM CURRENT USE OF INSULIN (HCC): ICD-10-CM

## 2023-06-29 DIAGNOSIS — M20.41 HAMMER TOE OF SECOND TOE OF RIGHT FOOT: ICD-10-CM

## 2023-06-29 DIAGNOSIS — Z79.01 LONG TERM (CURRENT) USE OF ANTICOAGULANTS: ICD-10-CM

## 2023-06-29 DIAGNOSIS — Z95.5 STENTED CORONARY ARTERY: Primary | ICD-10-CM

## 2023-06-29 DIAGNOSIS — I25.2 HISTORY OF HEART ATTACK: ICD-10-CM

## 2023-06-29 PROBLEM — E11.621 DIABETIC FOOT ULCER (HCC): Status: ACTIVE | Noted: 2023-06-29

## 2023-06-29 PROCEDURE — 87075 CULTR BACTERIA EXCEPT BLOOD: CPT

## 2023-06-29 PROCEDURE — 99213 OFFICE O/P EST LOW 20 MIN: CPT

## 2023-06-29 PROCEDURE — 87070 CULTURE OTHR SPECIMN AEROBIC: CPT

## 2023-06-29 PROCEDURE — 11042 DBRDMT SUBQ TIS 1ST 20SQCM/<: CPT

## 2023-06-29 RX ORDER — BETAMETHASONE DIPROPIONATE 0.05 %
OINTMENT (GRAM) TOPICAL ONCE
OUTPATIENT
Start: 2023-06-29 | End: 2023-06-29

## 2023-06-29 RX ORDER — LIDOCAINE 40 MG/G
CREAM TOPICAL ONCE
OUTPATIENT
Start: 2023-06-29 | End: 2023-06-29

## 2023-06-29 RX ORDER — LIDOCAINE 50 MG/G
OINTMENT TOPICAL ONCE
OUTPATIENT
Start: 2023-06-29 | End: 2023-06-29

## 2023-06-29 RX ORDER — GENTAMICIN SULFATE 1 MG/G
OINTMENT TOPICAL ONCE
OUTPATIENT
Start: 2023-06-29 | End: 2023-06-29

## 2023-06-29 RX ORDER — SODIUM CHLOR/HYPOCHLOROUS ACID 0.033 %
SOLUTION, IRRIGATION IRRIGATION ONCE
OUTPATIENT
Start: 2023-06-29 | End: 2023-06-29

## 2023-06-29 RX ORDER — LIDOCAINE HYDROCHLORIDE 20 MG/ML
JELLY TOPICAL ONCE
OUTPATIENT
Start: 2023-06-29 | End: 2023-06-29

## 2023-06-29 RX ORDER — CLOBETASOL PROPIONATE 0.5 MG/G
OINTMENT TOPICAL ONCE
OUTPATIENT
Start: 2023-06-29 | End: 2023-06-29

## 2023-06-29 RX ORDER — GINSENG 100 MG
CAPSULE ORAL ONCE
OUTPATIENT
Start: 2023-06-29 | End: 2023-06-29

## 2023-06-29 RX ORDER — LIDOCAINE HYDROCHLORIDE 40 MG/ML
SOLUTION TOPICAL ONCE
OUTPATIENT
Start: 2023-06-29 | End: 2023-06-29

## 2023-06-29 RX ORDER — BACITRACIN ZINC AND POLYMYXIN B SULFATE 500; 1000 [USP'U]/G; [USP'U]/G
OINTMENT TOPICAL ONCE
OUTPATIENT
Start: 2023-06-29 | End: 2023-06-29

## 2023-06-29 RX ORDER — IBUPROFEN 200 MG
TABLET ORAL ONCE
OUTPATIENT
Start: 2023-06-29 | End: 2023-06-29

## 2023-07-02 LAB
BACTERIA SPEC ANAEROBE CULT: NORMAL
BACTERIA WND AEROBE CULT: NORMAL

## 2023-07-06 ENCOUNTER — HOSPITAL ENCOUNTER (OUTPATIENT)
Dept: WOUND CARE | Age: 88
Discharge: HOME | End: 2023-07-06
Payer: MEDICARE

## 2023-07-06 VITALS
DIASTOLIC BLOOD PRESSURE: 76 MMHG | TEMPERATURE: 97.3 F | HEART RATE: 78 BPM | SYSTOLIC BLOOD PRESSURE: 170 MMHG | RESPIRATION RATE: 16 BRPM

## 2023-07-06 DIAGNOSIS — L97.512 DIABETIC ULCER OF TOE OF RIGHT FOOT ASSOCIATED WITH DIABETES MELLITUS DUE TO UNDERLYING CONDITION, WITH FAT LAYER EXPOSED (HCC): Primary | ICD-10-CM

## 2023-07-06 DIAGNOSIS — E08.621 DIABETIC ULCER OF TOE OF RIGHT FOOT ASSOCIATED WITH DIABETES MELLITUS DUE TO UNDERLYING CONDITION, WITH FAT LAYER EXPOSED (HCC): Primary | ICD-10-CM

## 2023-07-06 PROCEDURE — 11042 DBRDMT SUBQ TIS 1ST 20SQCM/<: CPT

## 2023-07-06 RX ORDER — GENTAMICIN SULFATE 1 MG/G
OINTMENT TOPICAL
Qty: 30 G | Refills: 2 | Status: SHIPPED | OUTPATIENT
Start: 2023-07-06 | End: 2023-07-13

## 2023-07-06 RX ORDER — LIDOCAINE HYDROCHLORIDE 20 MG/ML
JELLY TOPICAL ONCE
OUTPATIENT
Start: 2023-07-06 | End: 2023-07-06

## 2023-07-06 RX ORDER — CLOBETASOL PROPIONATE 0.5 MG/G
OINTMENT TOPICAL ONCE
OUTPATIENT
Start: 2023-07-06 | End: 2023-07-06

## 2023-07-06 RX ORDER — LIDOCAINE HYDROCHLORIDE 40 MG/ML
SOLUTION TOPICAL ONCE
Status: COMPLETED | OUTPATIENT
Start: 2023-07-06 | End: 2023-07-06

## 2023-07-06 RX ORDER — GINSENG 100 MG
CAPSULE ORAL ONCE
OUTPATIENT
Start: 2023-07-06 | End: 2023-07-06

## 2023-07-06 RX ORDER — LIDOCAINE HYDROCHLORIDE 40 MG/ML
SOLUTION TOPICAL ONCE
OUTPATIENT
Start: 2023-07-06 | End: 2023-07-06

## 2023-07-06 RX ORDER — LIDOCAINE 40 MG/G
CREAM TOPICAL ONCE
OUTPATIENT
Start: 2023-07-06 | End: 2023-07-06

## 2023-07-06 RX ORDER — BETAMETHASONE DIPROPIONATE 0.05 %
OINTMENT (GRAM) TOPICAL ONCE
OUTPATIENT
Start: 2023-07-06 | End: 2023-07-06

## 2023-07-06 RX ORDER — IBUPROFEN 200 MG
TABLET ORAL ONCE
OUTPATIENT
Start: 2023-07-06 | End: 2023-07-06

## 2023-07-06 RX ORDER — SODIUM CHLOR/HYPOCHLOROUS ACID 0.033 %
SOLUTION, IRRIGATION IRRIGATION ONCE
OUTPATIENT
Start: 2023-07-06 | End: 2023-07-06

## 2023-07-06 RX ORDER — GENTAMICIN SULFATE 1 MG/G
OINTMENT TOPICAL ONCE
OUTPATIENT
Start: 2023-07-06 | End: 2023-07-06

## 2023-07-06 RX ORDER — BACITRACIN ZINC AND POLYMYXIN B SULFATE 500; 1000 [USP'U]/G; [USP'U]/G
OINTMENT TOPICAL ONCE
OUTPATIENT
Start: 2023-07-06 | End: 2023-07-06

## 2023-07-06 RX ORDER — LIDOCAINE 50 MG/G
OINTMENT TOPICAL ONCE
OUTPATIENT
Start: 2023-07-06 | End: 2023-07-06

## 2023-07-06 RX ADMIN — LIDOCAINE HYDROCHLORIDE 10 ML: 40 SOLUTION TOPICAL at 09:49

## 2023-07-06 NOTE — DISCHARGE INSTRUCTIONS
Visit Discharge/Physician Orders     Discharge condition: Stable     Assessment of pain at discharge: STABLE      Anesthetic used: 4% TOPICAL LIDOCAINE      Discharge to: Home     Left via:Private automobile     Accompanied by: accompanied by SELF     ECF/HHA:      Dressing Orders: CLEAN WOUND ON RIGHT SECOND TOE WITH NORMAL SALINE, APPLY Gentamycin ointmet (rx), AQUACEL AG, FOAM AND DRY DRESSING. CHANGE DAILY. Place gauze In between great toe and second toe for offloading      Treatment Orders: Culture reviewed   [address]      [facility] followup visit ______________1 WEEK_______________  (Please note your next appointment above and if you are unable to keep, kindly give a 24 hour notice. Thank you.)     Physician signature:__________________________        If you experience any of the following, please call the [facility] during business hours:     * Increase in Pain  * Temperature over 101  * Increase in drainage from your wound  * Drainage with a foul odor  * Bleeding  * Increase in swelling  * Need for compression bandage changes due to slippage, breakthrough drainage. If you need medical attention outside of the business hours of the [facility] please contact your PCP or go to the nearest emergency room.

## 2023-07-06 NOTE — WOUND CARE
[address] [place] [address] West:     [employer] [facility] [id number] [facility] [place] p: [phone] f: [phone]     Ordering Center:     Esther Yang,Eulog[address]  [address]  [phone]  WOUND CARE Dept: [address] [id number] [phone]    Patient Information:      Erika Bustillo  [address] [facility] [address]   [phone]   : 1935  AGE: 81 y.o. GENDER: female   EPISODE DATE: 2023    Insurance:      PRIMARY INSURANCE:  Plan: [employer] ESSENTIAL/PLUS  Coverage: BCBS MEDICARE  Effective Date: 2022  Group Number: [unfilled]  Subscriber Number: [id number] - (Medicare Managed)    Payer/Plan Subscr  Sex Relation Sub. Ins. ID Effective Group Num   1. BCBS MEDICAREErika Quails 1935 Female Self [id number]                                    [address] [id number]       Patient Wound Information:      Problem List Items Addressed This Visit          Endocrine    Diabetic foot ulcer ([address] [facility]) - Primary    Relevant Orders    Initiate Outpatient Wound Care Protocol       WOUNDS REQUIRING DRESSING SUPPLIES:     Wound 23 Toe (Comment  which one) Right; Anterior #1 2nd toe (Active)   Wound Image   23 0848   Wound Etiology Pressure Stage 2 23 0848   Dressing Status New dressing applied 23 1038   Wound Cleansed Cleansed with saline 23 1038   Dressing/Treatment Alginate with Ag;Dry dressing; Foam 23 1038   Wound Length (cm) 1.5 cm 23 0945   Wound Width (cm) 1.2 cm 23 0945   Wound Depth (cm) 0.1 cm 23 0945   Wound Surface Area (cm^2) 1.8 cm^2 23 0945   Change in Wound Size % (l*w) -23 0945   Wound Volume (cm^3) 0.18 cm^3 23 0945   Wound Healing % -23 0945   Post-Procedure Length (cm) 1.5 cm 23 1018   Post-Procedure Width (cm) 1.2 cm 23 1018   Post-Procedure Depth (cm) 0.2 cm 238   Post-Procedure Surface Area

## 2023-07-08 NOTE — PROGRESS NOTES
Number of days: 843       Incision 09/02/21 Right (Active)   Number of days: 673       Assessment:     Please refer to nursing measurements and assessment regarding wound size pre and post debridement. Wound check - Care provided includes removal of existing dressing and visual inspection    Procedure: Debridement Note: Wound # 1. At 1.8 cm sq. The patient was placed in the sitting position. Lidocaine  gauze was applied  at beginning of wound evaluation. An  Excisional Debridement was performed. Using a curette ,  the wound was debrided sharply of all fibrotic, necrotic, and hyperkeratotic tissue, including a layer of surrounding healthy tissue to stimulate epithelization. The wound was excised through the level of the subcutaneous Wound Percentage debrided is 100%. Please refer to Nurses notes for pre and post debridement dimensions. Wound was irrigated with normal saline solution. Bleeding was with a small amount of bleeding, and controlled with pressure. Patient tolerated procedure well and was given proper instruction. Diagnosis: Other: stage II pressure ulcer                    Active Problems:    Hammer toe of second toe of right foot  Resolved Problems:    * No resolved hospital problems. *    Plan:      Treatment & Plan: 1.Excisional Debridement                              2. Alginate w/silver pad, support toe with gauze; gentamycin ointment                              3. Discussed appropriate home care of this wound. 4. Patient instructions were given. 5. Follow Up in 1 week(s).                                    KIM Kelly CNP   7/7/23     9:56 PM

## 2023-07-13 ENCOUNTER — HOSPITAL ENCOUNTER (OUTPATIENT)
Dept: WOUND CARE | Age: 88
Discharge: HOME | End: 2023-07-13
Payer: MEDICARE

## 2023-07-13 VITALS
RESPIRATION RATE: 16 BRPM | HEART RATE: 79 BPM | HEIGHT: 66 IN | SYSTOLIC BLOOD PRESSURE: 183 MMHG | WEIGHT: 175 LBS | BODY MASS INDEX: 28.12 KG/M2 | DIASTOLIC BLOOD PRESSURE: 87 MMHG | TEMPERATURE: 96.5 F

## 2023-07-13 DIAGNOSIS — L97.512 DIABETIC ULCER OF TOE OF RIGHT FOOT ASSOCIATED WITH DIABETES MELLITUS DUE TO UNDERLYING CONDITION, WITH FAT LAYER EXPOSED (HCC): Primary | ICD-10-CM

## 2023-07-13 DIAGNOSIS — E08.621 DIABETIC ULCER OF TOE OF RIGHT FOOT ASSOCIATED WITH DIABETES MELLITUS DUE TO UNDERLYING CONDITION, WITH FAT LAYER EXPOSED (HCC): Primary | ICD-10-CM

## 2023-07-13 PROBLEM — L89.893 PRESSURE ULCER OF TOE OF RIGHT FOOT, STAGE 3 (HCC): Status: ACTIVE | Noted: 2023-07-13

## 2023-07-13 PROCEDURE — 97597 DBRDMT OPN WND 1ST 20 CM/<: CPT

## 2023-07-13 RX ORDER — BACITRACIN ZINC AND POLYMYXIN B SULFATE 500; 1000 [USP'U]/G; [USP'U]/G
OINTMENT TOPICAL ONCE
OUTPATIENT
Start: 2023-07-13 | End: 2023-07-13

## 2023-07-13 RX ORDER — SODIUM CHLOR/HYPOCHLOROUS ACID 0.033 %
SOLUTION, IRRIGATION IRRIGATION ONCE
OUTPATIENT
Start: 2023-07-13 | End: 2023-07-13

## 2023-07-13 RX ORDER — LIDOCAINE HYDROCHLORIDE 20 MG/ML
JELLY TOPICAL ONCE
OUTPATIENT
Start: 2023-07-13 | End: 2023-07-13

## 2023-07-13 RX ORDER — IBUPROFEN 200 MG
TABLET ORAL ONCE
OUTPATIENT
Start: 2023-07-13 | End: 2023-07-13

## 2023-07-13 RX ORDER — LIDOCAINE 40 MG/G
CREAM TOPICAL ONCE
OUTPATIENT
Start: 2023-07-13 | End: 2023-07-13

## 2023-07-13 RX ORDER — GINSENG 100 MG
CAPSULE ORAL ONCE
OUTPATIENT
Start: 2023-07-13 | End: 2023-07-13

## 2023-07-13 RX ORDER — LIDOCAINE HYDROCHLORIDE 40 MG/ML
SOLUTION TOPICAL ONCE
OUTPATIENT
Start: 2023-07-13 | End: 2023-07-13

## 2023-07-13 RX ORDER — LIDOCAINE HYDROCHLORIDE 40 MG/ML
SOLUTION TOPICAL ONCE
Status: COMPLETED | OUTPATIENT
Start: 2023-07-13 | End: 2023-07-13

## 2023-07-13 RX ORDER — BETAMETHASONE DIPROPIONATE 0.05 %
OINTMENT (GRAM) TOPICAL ONCE
OUTPATIENT
Start: 2023-07-13 | End: 2023-07-13

## 2023-07-13 RX ORDER — CLOBETASOL PROPIONATE 0.5 MG/G
OINTMENT TOPICAL ONCE
OUTPATIENT
Start: 2023-07-13 | End: 2023-07-13

## 2023-07-13 RX ORDER — GENTAMICIN SULFATE 1 MG/G
OINTMENT TOPICAL ONCE
OUTPATIENT
Start: 2023-07-13 | End: 2023-07-13

## 2023-07-13 RX ORDER — LIDOCAINE 50 MG/G
OINTMENT TOPICAL ONCE
OUTPATIENT
Start: 2023-07-13 | End: 2023-07-13

## 2023-07-13 RX ADMIN — LIDOCAINE HYDROCHLORIDE 5 ML: 40 SOLUTION TOPICAL at 10:04

## 2023-07-13 NOTE — WOUND CARE
[address]:      [employer] [id number] [facility] [place] p: [phone] f: [phone]      Ordering Center:      [address] Yang,[address]  [facility] [address] [facility]  [phone]  WOUND CARE Dept: [address] MARTAZJAIR [phone]     Patient Information:      April Arias  [address] [facility] [address]   [phone]   : 1935  AGE: 81 y.o. GENDER: female   EPISODE DATE: 2023     Insurance:      PRIMARY INSURANCE:  Plan: [employer] ESSENTIAL/PLUS  Coverage: [employer] MEDICARE  Effective Date: 2022  Group Number: [unfilled]  Subscriber Number: [id number] - (Medicare Managed)     Payer/Plan Subscr  Sex Relation Sub. Ins. ID Effective Group Num   1. BCBS MEDICAREEvchina Arias 1935 Female Self [id number] [id number]                                    [id number]            Patient Wound Information:      Problem List Items Addressed This Visit                  Endocrine     Diabetic foot ulcer ([address] [facility]) - Primary     Relevant Orders     Initiate Outpatient Wound Care Protocol         WOUNDS REQUIRING DRESSING SUPPLIES:           Wound 23 Toe (Comment  which one) Right; Anterior #1 2nd toe (Active)   Wound Image   23 0848   Wound Etiology Pressure Stage 2 23 0848   Dressing Status New dressing applied 23 1038   Wound Cleansed Cleansed with saline 23 1038   Dressing/Treatment Alginate with Ag;Dry dressing; Foam 23 1038   Wound Length (cm) 1.5 cm 23 0945   Wound Width (cm) 1.2 cm 23 0945   Wound Depth (cm) 0.1 cm 23 0945   Wound Surface Area (cm^2) 1.8 cm^2 23 0945   Change in Wound Size % (l*w) -23 0945   Wound Volume (cm^3) 0.18 cm^3 23 0945   Wound Healing % -23 0945   Post-Procedure Length (cm) 1.5 cm 23 1018   Post-Procedure Width (cm) 1.2 cm 23 1018   Post-Procedure Depth (cm) 0.2 cm 23 1018

## 2024-03-11 NOTE — DISCHARGE INSTRUCTIONS
Visit Discharge/Physician Orders     Discharge condition: Stable     Assessment of pain at discharge: STABLE      Anesthetic used: 4% TOPICAL LIDOCAINE      Discharge to: Home     Left via:Private automobile     Accompanied by: accompanied by SELF     ECF/HHA:      Dressing Orders: CLEAN WOUND ON RIGHT SECOND TOE WITH NORMAL SALINE, APPLY Gentamycin ointmet (rx), PLAIN, FOAM AND DRY DRESSING. CHANGE DAILY. Place gauze In between great toe and second toe for offloading      Treatment Orders: Culture reviewed   [address] [place] [address] followup visit ______________1 WEEK_______________  (Please note your next appointment above and if you are unable to keep, kindly give a 24 hour notice. Thank you.)     Physician signature:__________________________        If you experience any of the following, please call the [facility] during business hours:     * Increase in Pain  * Temperature over 101  * Increase in drainage from your wound  * Drainage with a foul odor  * Bleeding  * Increase in swelling  * Need for compression bandage changes due to slippage, breakthrough drainage. If you need medical attention outside of the business hours of the [facility] please contact your PCP or go to the nearest emergency room.
0

## 2024-08-16 ENCOUNTER — HOSPITAL ENCOUNTER (EMERGENCY)
Age: 89
Discharge: HOME | End: 2024-08-17
Attending: EMERGENCY MEDICINE
Payer: MEDICARE

## 2024-08-16 ENCOUNTER — APPOINTMENT (OUTPATIENT)
Dept: CT IMAGING | Age: 89
End: 2024-08-16
Payer: MEDICARE

## 2024-08-16 ENCOUNTER — APPOINTMENT (OUTPATIENT)
Dept: GENERAL RADIOLOGY | Age: 89
End: 2024-08-16
Payer: MEDICARE

## 2024-08-16 VITALS
TEMPERATURE: 98.5 F | WEIGHT: 174 LBS | OXYGEN SATURATION: 98 % | DIASTOLIC BLOOD PRESSURE: 68 MMHG | RESPIRATION RATE: 18 BRPM | HEART RATE: 60 BPM | BODY MASS INDEX: 27.97 KG/M2 | HEIGHT: 66 IN | SYSTOLIC BLOOD PRESSURE: 155 MMHG

## 2024-08-16 DIAGNOSIS — S81.012A LACERATION OF LEFT KNEE, INITIAL ENCOUNTER: ICD-10-CM

## 2024-08-16 DIAGNOSIS — S01.01XA LACERATION OF SCALP, INITIAL ENCOUNTER: Primary | ICD-10-CM

## 2024-08-16 PROCEDURE — 73562 X-RAY EXAM OF KNEE 3: CPT

## 2024-08-16 PROCEDURE — 90471 IMMUNIZATION ADMIN: CPT | Performed by: EMERGENCY MEDICINE

## 2024-08-16 PROCEDURE — 96372 THER/PROPH/DIAG INJ SC/IM: CPT

## 2024-08-16 PROCEDURE — 99284 EMERGENCY DEPT VISIT MOD MDM: CPT

## 2024-08-16 PROCEDURE — 72125 CT NECK SPINE W/O DYE: CPT

## 2024-08-16 PROCEDURE — 6360000002 HC RX W HCPCS: Performed by: EMERGENCY MEDICINE

## 2024-08-16 PROCEDURE — 12002 RPR S/N/AX/GEN/TRNK2.6-7.5CM: CPT

## 2024-08-16 PROCEDURE — 70450 CT HEAD/BRAIN W/O DYE: CPT

## 2024-08-16 PROCEDURE — 72170 X-RAY EXAM OF PELVIS: CPT

## 2024-08-16 PROCEDURE — 90714 TD VACC NO PRESV 7 YRS+ IM: CPT | Performed by: EMERGENCY MEDICINE

## 2024-08-16 RX ADMIN — CLOSTRIDIUM TETANI TOXOID ANTIGEN (FORMALDEHYDE INACTIVATED) AND CORYNEBACTERIUM DIPHTHERIAE TOXOID ANTIGEN (FORMALDEHYDE INACTIVATED) 0.5 ML: 5; 2 INJECTION, SUSPENSION INTRAMUSCULAR at 22:56

## 2024-08-16 ASSESSMENT — ENCOUNTER SYMPTOMS
SINUS PRESSURE: 0
NAUSEA: 0
SORE THROAT: 0
DIARRHEA: 0
COUGH: 0
VOMITING: 0
ABDOMINAL DISTENTION: 0
BACK PAIN: 0
SHORTNESS OF BREATH: 0
WHEEZING: 0
EYE PAIN: 0
EYE REDNESS: 0
EYE DISCHARGE: 0

## 2024-08-16 ASSESSMENT — PAIN - FUNCTIONAL ASSESSMENT: PAIN_FUNCTIONAL_ASSESSMENT: NONE - DENIES PAIN

## 2024-08-17 NOTE — ED PROVIDER NOTES
prognosis.  Their questions are answered at this time and they are agreeable with the plan. I discussed at length with them reasons for immediate return here for re evaluation. They will followup with primary care by calling their office tomorrow.      --------------------------------- ADDITIONAL PROVIDER NOTES ---------------------------------  At this time the patient is without objective evidence of an acute process requiring hospitalization or inpatient management.  They have remained hemodynamically stable throughout their entire ED visit and are stable for discharge with outpatient follow-up.     The plan has been discussed in detail and they are aware of the specific conditions for emergent return, as well as the importance of follow-up.      New Prescriptions    No medications on file       Diagnosis:  1. Laceration of scalp, initial encounter    2. Laceration of left knee, initial encounter        Disposition:  Patient's disposition: Discharge to home  Patient's condition is stable.    ATTENDING PROVIDER ATTESTATION:     I have personally performed and/or participated in the history, exam, medical decision making, and procedures and agree with all pertinent clinical information.      I have also reviewed and agree with the past medical, family and social history unless otherwise noted.    I have discussed this patient in detail with the resident, and provided the instruction and education regarding lacerations and repair.    My findings/Plan: Complex laceration of left knee repaired under my direct supervision and without complication.       Nicolas Chester DO  08/17/24 0113

## 2025-03-27 ENCOUNTER — APPOINTMENT (OUTPATIENT)
Dept: GENERAL RADIOLOGY | Age: 89
DRG: 616 | End: 2025-03-27
Payer: MEDICARE

## 2025-03-27 ENCOUNTER — HOSPITAL ENCOUNTER (INPATIENT)
Age: 89
LOS: 6 days | Discharge: HOME HEALTH | DRG: 616 | End: 2025-04-02
Attending: EMERGENCY MEDICINE | Admitting: INTERNAL MEDICINE
Payer: MEDICARE

## 2025-03-27 DIAGNOSIS — L97.512 DIABETIC ULCER OF TOE OF RIGHT FOOT ASSOCIATED WITH DIABETES MELLITUS DUE TO UNDERLYING CONDITION, WITH FAT LAYER EXPOSED: ICD-10-CM

## 2025-03-27 DIAGNOSIS — M86.171 ACUTE OSTEOMYELITIS OF TOE OF RIGHT FOOT (HCC): ICD-10-CM

## 2025-03-27 DIAGNOSIS — E08.621 DIABETIC ULCER OF TOE OF RIGHT FOOT ASSOCIATED WITH DIABETES MELLITUS DUE TO UNDERLYING CONDITION, WITH FAT LAYER EXPOSED: ICD-10-CM

## 2025-03-27 DIAGNOSIS — I10 ESSENTIAL HYPERTENSION: Primary | ICD-10-CM

## 2025-03-27 DIAGNOSIS — L89.893 PRESSURE ULCER OF TOE OF RIGHT FOOT, STAGE 3 (HCC): ICD-10-CM

## 2025-03-27 PROBLEM — M20.41 HAMMER TOE OF RIGHT FOOT: Status: ACTIVE | Noted: 2025-03-27

## 2025-03-27 LAB
ALBUMIN SERPL-MCNC: 3.9 G/DL (ref 3.5–5.2)
ALP SERPL-CCNC: 73 U/L (ref 35–104)
ALT SERPL-CCNC: 46 U/L (ref 0–32)
ANION GAP SERPL CALCULATED.3IONS-SCNC: 9 MMOL/L (ref 7–16)
AST SERPL-CCNC: 38 U/L (ref 0–31)
BASOPHILS # BLD: 0.03 K/UL (ref 0–0.2)
BASOPHILS NFR BLD: 0 % (ref 0–2)
BILIRUB SERPL-MCNC: 0.3 MG/DL (ref 0–1.2)
BUN SERPL-MCNC: 23 MG/DL (ref 6–23)
CALCIUM SERPL-MCNC: 9.5 MG/DL (ref 8.6–10.2)
CHLORIDE SERPL-SCNC: 102 MMOL/L (ref 98–107)
CO2 SERPL-SCNC: 27 MMOL/L (ref 22–29)
CREAT SERPL-MCNC: 1.3 MG/DL (ref 0.5–1)
EOSINOPHIL # BLD: 0.09 K/UL (ref 0.05–0.5)
EOSINOPHILS RELATIVE PERCENT: 1 % (ref 0–6)
ERYTHROCYTE [DISTWIDTH] IN BLOOD BY AUTOMATED COUNT: 15.3 % (ref 11.5–15)
ERYTHROCYTE [SEDIMENTATION RATE] IN BLOOD BY WESTERGREN METHOD: 54 MM/HR (ref 0–20)
GFR, ESTIMATED: 41 ML/MIN/1.73M2
GLUCOSE SERPL-MCNC: 146 MG/DL (ref 74–99)
HCT VFR BLD AUTO: 34.6 % (ref 34–48)
HGB BLD-MCNC: 11.2 G/DL (ref 11.5–15.5)
IMM GRANULOCYTES # BLD AUTO: 0.04 K/UL (ref 0–0.58)
IMM GRANULOCYTES NFR BLD: 1 % (ref 0–5)
LYMPHOCYTES NFR BLD: 0.91 K/UL (ref 1.5–4)
LYMPHOCYTES RELATIVE PERCENT: 11 % (ref 20–42)
MCH RBC QN AUTO: 29.2 PG (ref 26–35)
MCHC RBC AUTO-ENTMCNC: 32.4 G/DL (ref 32–34.5)
MCV RBC AUTO: 90.1 FL (ref 80–99.9)
MONOCYTES NFR BLD: 0.85 K/UL (ref 0.1–0.95)
MONOCYTES NFR BLD: 10 % (ref 2–12)
NEUTROPHILS NFR BLD: 78 % (ref 43–80)
NEUTS SEG NFR BLD: 6.69 K/UL (ref 1.8–7.3)
PLATELET # BLD AUTO: 200 K/UL (ref 130–450)
PMV BLD AUTO: 9.1 FL (ref 7–12)
POTASSIUM SERPL-SCNC: 4.1 MMOL/L (ref 3.5–5)
PROT SERPL-MCNC: 7.5 G/DL (ref 6.4–8.3)
RBC # BLD AUTO: 3.84 M/UL (ref 3.5–5.5)
SODIUM SERPL-SCNC: 138 MMOL/L (ref 132–146)
WBC OTHER # BLD: 8.6 K/UL (ref 4.5–11.5)

## 2025-03-27 PROCEDURE — 85652 RBC SED RATE AUTOMATED: CPT

## 2025-03-27 PROCEDURE — 99285 EMERGENCY DEPT VISIT HI MDM: CPT

## 2025-03-27 PROCEDURE — 85025 COMPLETE CBC W/AUTO DIFF WBC: CPT

## 2025-03-27 PROCEDURE — 87070 CULTURE OTHR SPECIMN AEROBIC: CPT

## 2025-03-27 PROCEDURE — 87205 SMEAR GRAM STAIN: CPT

## 2025-03-27 PROCEDURE — 73630 X-RAY EXAM OF FOOT: CPT

## 2025-03-27 PROCEDURE — 2060000000 HC ICU INTERMEDIATE R&B

## 2025-03-27 PROCEDURE — 86140 C-REACTIVE PROTEIN: CPT

## 2025-03-27 PROCEDURE — 80053 COMPREHEN METABOLIC PANEL: CPT

## 2025-03-27 ASSESSMENT — LIFESTYLE VARIABLES
HOW MANY STANDARD DRINKS CONTAINING ALCOHOL DO YOU HAVE ON A TYPICAL DAY: PATIENT DOES NOT DRINK
HOW OFTEN DO YOU HAVE A DRINK CONTAINING ALCOHOL: NEVER

## 2025-03-27 ASSESSMENT — PAIN - FUNCTIONAL ASSESSMENT: PAIN_FUNCTIONAL_ASSESSMENT: NONE - DENIES PAIN

## 2025-03-27 NOTE — ED NOTES
Department of Emergency Medicine  FIRST PROVIDER TRIAGE NOTE             Independent MLP           3/27/25  7:29 PM EDT    Date of Encounter: 3/27/25   MRN: [id number]      HPI: Yola Scott is a 89 y.o. female who presents to the ED for Toe Pain (Pt states that she has a hammer tow on her right foot, and Dr. Esposito wanted her to come in )     Sent by  for evaluation of  right 2 nd digit hammer toe with possible amputation.     ROS: Negative for fever, chills    PE: Right 2nd digit-deformed/bleeding, Lungs CTA    Initial Plan of Care: All treatment areas with department are currently occupied.      Plan to order/Initiate the following while awaiting opening in ED: Triage evaluation  .     Provider-Patient relationship only established for Provider In Triage (PIT).  Full assessment, HPI and examination not performed, therefore, it is not yet possible to state whether or not an emergency medical condition exists     Initial Plan of Care: Initiate Treatment-Testing, Proceed toTreatment Area When Bed Available for ED Attending/MLP to Continue Care  Secondary to high volume, low staffing, and/or boarding- patient to await bed availability.     This ends my PIT-Patient relationship.  Care of patient relinquished after triage         Electronically signed by KIM Valentino CNP   DD: 3/27/25      Yola Allen APRN - CNP  03/27/25 1934

## 2025-03-27 NOTE — ED PROVIDER NOTES
[facility] EMERGENCY DEPARTMENT  EMERGENCY DEPARTMENT ENCOUNTER        Pt Name: Yola Scott  MRN: [id number]  Birthdate 1935  Date of evaluation: 3/27/2025  Provider: Sammy Rojas DO  PCP: Alfred Lopez MD  Note Started: 7:42 PM EDT 3/27/25    CHIEF COMPLAINT       Chief Complaint   Patient presents with    Toe Pain     Pt states that she has a hammer tow on her right foot, and Dr. Esposito wanted her to come in        HISTORY OF PRESENT ILLNESS: 1 or more Elements   History From: patient    Limitations to history : None    Yola Scott is a 89 y.o. female who presents with right foot pain.  Patient was seen by podiatry, Dr. Esposito and told to come to the ER for a hammertoe.  She has been following with him for years regarding this issue.  She was told that he wants to admit her for possible surgery/amputation of the toe.    Patient denies fever, chills, headache, shortness of breath, chest pain, abdominal pain, nausea, vomiting, diarrhea, lightheadedness, dysuria, hematuria, hematochezia, and melena.    Nursing Notes were all reviewed and agreed with or any disagreements were addressed in the HPI.        REVIEW OF SYSTEMS :           Positives and Pertinent negatives as per HPI.     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 OR    LEG BIOPSY EXCISION Right 09/02/2021    EXCISION SQUAMOUS

## 2025-03-28 ENCOUNTER — ANESTHESIA EVENT (OUTPATIENT)
Dept: OPERATING ROOM | Age: 89
End: 2025-03-28
Payer: MEDICARE

## 2025-03-28 ENCOUNTER — ANESTHESIA (OUTPATIENT)
Dept: OPERATING ROOM | Age: 89
End: 2025-03-28
Payer: MEDICARE

## 2025-03-28 ENCOUNTER — APPOINTMENT (OUTPATIENT)
Dept: CT IMAGING | Age: 89
DRG: 616 | End: 2025-03-28
Payer: MEDICARE

## 2025-03-28 LAB
ALBUMIN SERPL-MCNC: 3.5 G/DL (ref 3.5–5.2)
ALP SERPL-CCNC: 63 U/L (ref 35–104)
ALT SERPL-CCNC: 41 U/L (ref 0–32)
ANION GAP SERPL CALCULATED.3IONS-SCNC: 10 MMOL/L (ref 7–16)
AST SERPL-CCNC: 34 U/L (ref 0–31)
BASOPHILS # BLD: 0.04 K/UL (ref 0–0.2)
BASOPHILS NFR BLD: 1 % (ref 0–2)
BILIRUB SERPL-MCNC: 0.4 MG/DL (ref 0–1.2)
BUN SERPL-MCNC: 21 MG/DL (ref 6–23)
CALCIUM SERPL-MCNC: 9.1 MG/DL (ref 8.6–10.2)
CHLORIDE SERPL-SCNC: 103 MMOL/L (ref 98–107)
CO2 SERPL-SCNC: 24 MMOL/L (ref 22–29)
CREAT SERPL-MCNC: 0.9 MG/DL (ref 0.5–1)
CRP SERPL HS-MCNC: 29 MG/L (ref 0–5)
EOSINOPHIL # BLD: 0.19 K/UL (ref 0.05–0.5)
EOSINOPHILS RELATIVE PERCENT: 3 % (ref 0–6)
ERYTHROCYTE [DISTWIDTH] IN BLOOD BY AUTOMATED COUNT: 15.4 % (ref 11.5–15)
GFR, ESTIMATED: 59 ML/MIN/1.73M2
GLUCOSE BLD-MCNC: 117 MG/DL (ref 74–99)
GLUCOSE SERPL-MCNC: 112 MG/DL (ref 74–99)
HCT VFR BLD AUTO: 33.1 % (ref 34–48)
HGB BLD-MCNC: 10.7 G/DL (ref 11.5–15.5)
IMM GRANULOCYTES # BLD AUTO: <0.03 K/UL (ref 0–0.58)
IMM GRANULOCYTES NFR BLD: 0 % (ref 0–5)
LYMPHOCYTES NFR BLD: 1.09 K/UL (ref 1.5–4)
LYMPHOCYTES RELATIVE PERCENT: 15 % (ref 20–42)
MCH RBC QN AUTO: 29 PG (ref 26–35)
MCHC RBC AUTO-ENTMCNC: 32.3 G/DL (ref 32–34.5)
MCV RBC AUTO: 89.7 FL (ref 80–99.9)
MONOCYTES NFR BLD: 0.85 K/UL (ref 0.1–0.95)
MONOCYTES NFR BLD: 12 % (ref 2–12)
NEUTROPHILS NFR BLD: 70 % (ref 43–80)
NEUTS SEG NFR BLD: 5.03 K/UL (ref 1.8–7.3)
PLATELET # BLD AUTO: 207 K/UL (ref 130–450)
PMV BLD AUTO: 9.5 FL (ref 7–12)
POTASSIUM SERPL-SCNC: 3.9 MMOL/L (ref 3.5–5)
PROT SERPL-MCNC: 6.9 G/DL (ref 6.4–8.3)
RBC # BLD AUTO: 3.69 M/UL (ref 3.5–5.5)
SODIUM SERPL-SCNC: 137 MMOL/L (ref 132–146)
WBC OTHER # BLD: 7.2 K/UL (ref 4.5–11.5)

## 2025-03-28 PROCEDURE — 87070 CULTURE OTHR SPECIMN AEROBIC: CPT

## 2025-03-28 PROCEDURE — 0Y6R0Z0 DETACHMENT AT RIGHT 2ND TOE, COMPLETE, OPEN APPROACH: ICD-10-PCS | Performed by: PODIATRIST

## 2025-03-28 PROCEDURE — 87075 CULTR BACTERIA EXCEPT BLOOD: CPT

## 2025-03-28 PROCEDURE — 7100000001 HC PACU RECOVERY - ADDTL 15 MIN: Performed by: PODIATRIST

## 2025-03-28 PROCEDURE — 2060000000 HC ICU INTERMEDIATE R&B

## 2025-03-28 PROCEDURE — 6370000000 HC RX 637 (ALT 250 FOR IP): Performed by: INTERNAL MEDICINE

## 2025-03-28 PROCEDURE — 3600000002 HC SURGERY LEVEL 2 BASE: Performed by: PODIATRIST

## 2025-03-28 PROCEDURE — 86403 PARTICLE AGGLUT ANTBDY SCRN: CPT

## 2025-03-28 PROCEDURE — 2709999900 HC NON-CHARGEABLE SUPPLY: Performed by: PODIATRIST

## 2025-03-28 PROCEDURE — 3700000001 HC ADD 15 MINUTES (ANESTHESIA): Performed by: PODIATRIST

## 2025-03-28 PROCEDURE — 2580000003 HC RX 258: Performed by: NURSE ANESTHETIST, CERTIFIED REGISTERED

## 2025-03-28 PROCEDURE — 6360000002 HC RX W HCPCS: Performed by: PODIATRIST

## 2025-03-28 PROCEDURE — 7100000000 HC PACU RECOVERY - FIRST 15 MIN: Performed by: PODIATRIST

## 2025-03-28 PROCEDURE — 88305 TISSUE EXAM BY PATHOLOGIST: CPT

## 2025-03-28 PROCEDURE — 85025 COMPLETE CBC W/AUTO DIFF WBC: CPT

## 2025-03-28 PROCEDURE — 88311 DECALCIFY TISSUE: CPT

## 2025-03-28 PROCEDURE — 3700000000 HC ANESTHESIA ATTENDED CARE: Performed by: PODIATRIST

## 2025-03-28 PROCEDURE — 2500000003 HC RX 250 WO HCPCS: Performed by: INTERNAL MEDICINE

## 2025-03-28 PROCEDURE — 87205 SMEAR GRAM STAIN: CPT

## 2025-03-28 PROCEDURE — 80053 COMPREHEN METABOLIC PANEL: CPT

## 2025-03-28 PROCEDURE — 73700 CT LOWER EXTREMITY W/O DYE: CPT

## 2025-03-28 PROCEDURE — 3600000012 HC SURGERY LEVEL 2 ADDTL 15MIN: Performed by: PODIATRIST

## 2025-03-28 PROCEDURE — 82962 GLUCOSE BLOOD TEST: CPT

## 2025-03-28 PROCEDURE — 36415 COLL VENOUS BLD VENIPUNCTURE: CPT

## 2025-03-28 PROCEDURE — 6360000002 HC RX W HCPCS: Performed by: NURSE ANESTHETIST, CERTIFIED REGISTERED

## 2025-03-28 RX ORDER — MULTIVIT WITH MINERALS/LUTEIN
1000 TABLET ORAL DAILY
Status: DISCONTINUED | OUTPATIENT
Start: 2025-03-28 | End: 2025-03-28 | Stop reason: RX

## 2025-03-28 RX ORDER — AMLODIPINE BESYLATE 5 MG/1
5 TABLET ORAL DAILY
Status: ON HOLD | COMMUNITY
End: 2025-04-01 | Stop reason: HOSPADM

## 2025-03-28 RX ORDER — M-VIT,TX,IRON,MINS/CALC/FOLIC 27MG-0.4MG
TABLET ORAL DAILY
Status: DISCONTINUED | OUTPATIENT
Start: 2025-03-28 | End: 2025-04-02 | Stop reason: HOSPADM

## 2025-03-28 RX ORDER — FUROSEMIDE 20 MG/1
20 TABLET ORAL DAILY
COMMUNITY

## 2025-03-28 RX ORDER — LISINOPRIL 20 MG/1
40 TABLET ORAL 2 TIMES DAILY
Status: DISCONTINUED | OUTPATIENT
Start: 2025-03-28 | End: 2025-03-28

## 2025-03-28 RX ORDER — HYDRALAZINE HYDROCHLORIDE 20 MG/ML
10 INJECTION INTRAMUSCULAR; INTRAVENOUS
Status: DISCONTINUED | OUTPATIENT
Start: 2025-03-28 | End: 2025-03-28 | Stop reason: HOSPADM

## 2025-03-28 RX ORDER — PANTOPRAZOLE SODIUM 40 MG/1
40 TABLET, DELAYED RELEASE ORAL
Status: DISCONTINUED | OUTPATIENT
Start: 2025-03-28 | End: 2025-03-28

## 2025-03-28 RX ORDER — AMIODARONE HYDROCHLORIDE 200 MG/1
100 TABLET ORAL DAILY
Status: DISCONTINUED | OUTPATIENT
Start: 2025-03-28 | End: 2025-04-02 | Stop reason: HOSPADM

## 2025-03-28 RX ORDER — SODIUM CHLORIDE 9 MG/ML
INJECTION, SOLUTION INTRAVENOUS
Status: DISCONTINUED | OUTPATIENT
Start: 2025-03-28 | End: 2025-03-28 | Stop reason: SDUPTHER

## 2025-03-28 RX ORDER — FENTANYL CITRATE 50 UG/ML
25 INJECTION, SOLUTION INTRAMUSCULAR; INTRAVENOUS EVERY 5 MIN PRN
Status: DISCONTINUED | OUTPATIENT
Start: 2025-03-28 | End: 2025-03-28 | Stop reason: HOSPADM

## 2025-03-28 RX ORDER — PANTOPRAZOLE SODIUM 40 MG/1
40 TABLET, DELAYED RELEASE ORAL DAILY
Status: DISCONTINUED | OUTPATIENT
Start: 2025-03-28 | End: 2025-04-02 | Stop reason: HOSPADM

## 2025-03-28 RX ORDER — LANOLIN ALCOHOL/MO/W.PET/CERES
1 CREAM (GRAM) TOPICAL 2 TIMES DAILY WITH MEALS
Status: DISCONTINUED | OUTPATIENT
Start: 2025-03-28 | End: 2025-04-02 | Stop reason: HOSPADM

## 2025-03-28 RX ORDER — NALOXONE HYDROCHLORIDE 0.4 MG/ML
INJECTION, SOLUTION INTRAMUSCULAR; INTRAVENOUS; SUBCUTANEOUS PRN
Status: DISCONTINUED | OUTPATIENT
Start: 2025-03-28 | End: 2025-03-28 | Stop reason: HOSPADM

## 2025-03-28 RX ORDER — LISINOPRIL 20 MG/1
40 TABLET ORAL DAILY
Status: DISCONTINUED | OUTPATIENT
Start: 2025-03-28 | End: 2025-04-02 | Stop reason: HOSPADM

## 2025-03-28 RX ORDER — HYDROCODONE BITARTRATE AND ACETAMINOPHEN 5; 325 MG/1; MG/1
1 TABLET ORAL EVERY 6 HOURS PRN
Status: DISCONTINUED | OUTPATIENT
Start: 2025-03-28 | End: 2025-04-02 | Stop reason: HOSPADM

## 2025-03-28 RX ORDER — METOPROLOL SUCCINATE 25 MG/1
25 TABLET, EXTENDED RELEASE ORAL DAILY
Status: DISCONTINUED | OUTPATIENT
Start: 2025-03-28 | End: 2025-04-02 | Stop reason: HOSPADM

## 2025-03-28 RX ORDER — SODIUM CHLORIDE 0.9 % (FLUSH) 0.9 %
5-40 SYRINGE (ML) INJECTION PRN
Status: DISCONTINUED | OUTPATIENT
Start: 2025-03-28 | End: 2025-03-28 | Stop reason: HOSPADM

## 2025-03-28 RX ORDER — MINERAL OIL, PETROLATUM 425; 568 MG/G; MG/G
OINTMENT OPHTHALMIC PRN
Status: DISCONTINUED | OUTPATIENT
Start: 2025-03-28 | End: 2025-04-02 | Stop reason: HOSPADM

## 2025-03-28 RX ORDER — SODIUM CHLORIDE 9 MG/ML
INJECTION, SOLUTION INTRAVENOUS PRN
Status: DISCONTINUED | OUTPATIENT
Start: 2025-03-28 | End: 2025-03-28 | Stop reason: HOSPADM

## 2025-03-28 RX ORDER — FUROSEMIDE 20 MG/1
20 TABLET ORAL DAILY
Status: DISCONTINUED | OUTPATIENT
Start: 2025-03-28 | End: 2025-04-02 | Stop reason: HOSPADM

## 2025-03-28 RX ORDER — BUPIVACAINE HYDROCHLORIDE 5 MG/ML
INJECTION, SOLUTION EPIDURAL; INTRACAUDAL; PERINEURAL PRN
Status: DISCONTINUED | OUTPATIENT
Start: 2025-03-28 | End: 2025-03-28 | Stop reason: ALTCHOICE

## 2025-03-28 RX ORDER — DEXAMETHASONE SODIUM PHOSPHATE 10 MG/ML
INJECTION, SOLUTION INTRAMUSCULAR; INTRAVENOUS
Status: DISCONTINUED | OUTPATIENT
Start: 2025-03-28 | End: 2025-03-28 | Stop reason: SDUPTHER

## 2025-03-28 RX ORDER — GABAPENTIN 300 MG/1
600 CAPSULE ORAL 2 TIMES DAILY
Status: DISCONTINUED | OUTPATIENT
Start: 2025-03-28 | End: 2025-03-28 | Stop reason: DRUGHIGH

## 2025-03-28 RX ORDER — ALENDRONATE SODIUM 70 MG/1
70 TABLET ORAL WEEKLY
Status: DISCONTINUED | OUTPATIENT
Start: 2025-03-28 | End: 2025-03-28 | Stop reason: RX

## 2025-03-28 RX ORDER — LIDOCAINE HYDROCHLORIDE 20 MG/ML
INJECTION, SOLUTION INTRAVENOUS
Status: DISCONTINUED | OUTPATIENT
Start: 2025-03-28 | End: 2025-03-28 | Stop reason: SDUPTHER

## 2025-03-28 RX ORDER — PROCHLORPERAZINE EDISYLATE 5 MG/ML
5 INJECTION INTRAMUSCULAR; INTRAVENOUS
Status: DISCONTINUED | OUTPATIENT
Start: 2025-03-28 | End: 2025-03-28 | Stop reason: HOSPADM

## 2025-03-28 RX ORDER — CLOPIDOGREL BISULFATE 75 MG/1
75 TABLET ORAL DAILY
Status: DISCONTINUED | OUTPATIENT
Start: 2025-03-28 | End: 2025-04-02 | Stop reason: HOSPADM

## 2025-03-28 RX ORDER — GABAPENTIN 600 MG/1
600 TABLET ORAL 2 TIMES DAILY
Status: DISCONTINUED | OUTPATIENT
Start: 2025-03-28 | End: 2025-03-28 | Stop reason: SDUPTHER

## 2025-03-28 RX ORDER — ROSUVASTATIN CALCIUM 20 MG/1
20 TABLET, COATED ORAL DAILY
Status: DISCONTINUED | OUTPATIENT
Start: 2025-03-28 | End: 2025-04-02 | Stop reason: HOSPADM

## 2025-03-28 RX ORDER — MEPERIDINE HYDROCHLORIDE 25 MG/ML
12.5 INJECTION INTRAMUSCULAR; INTRAVENOUS; SUBCUTANEOUS EVERY 5 MIN PRN
Status: DISCONTINUED | OUTPATIENT
Start: 2025-03-28 | End: 2025-03-28 | Stop reason: HOSPADM

## 2025-03-28 RX ORDER — AMLODIPINE BESYLATE 5 MG/1
5 TABLET ORAL DAILY
Status: DISCONTINUED | OUTPATIENT
Start: 2025-03-28 | End: 2025-04-02 | Stop reason: HOSPADM

## 2025-03-28 RX ORDER — DIPHENHYDRAMINE HYDROCHLORIDE 50 MG/ML
12.5 INJECTION, SOLUTION INTRAMUSCULAR; INTRAVENOUS
Status: DISCONTINUED | OUTPATIENT
Start: 2025-03-28 | End: 2025-03-28 | Stop reason: HOSPADM

## 2025-03-28 RX ORDER — CALCIUM ACETATE 667 MG/1
667 TABLET ORAL EVERY 8 HOURS
COMMUNITY

## 2025-03-28 RX ORDER — PROPOFOL 10 MG/ML
INJECTION, EMULSION INTRAVENOUS
Status: DISCONTINUED | OUTPATIENT
Start: 2025-03-28 | End: 2025-03-28 | Stop reason: SDUPTHER

## 2025-03-28 RX ORDER — LISINOPRIL 10 MG/1
10 TABLET ORAL 2 TIMES DAILY
Status: DISCONTINUED | OUTPATIENT
Start: 2025-03-28 | End: 2025-03-28

## 2025-03-28 RX ORDER — FENTANYL CITRATE 50 UG/ML
INJECTION, SOLUTION INTRAMUSCULAR; INTRAVENOUS
Status: DISCONTINUED | OUTPATIENT
Start: 2025-03-28 | End: 2025-03-28 | Stop reason: SDUPTHER

## 2025-03-28 RX ORDER — METOPROLOL SUCCINATE 25 MG/1
25 TABLET, EXTENDED RELEASE ORAL 2 TIMES DAILY
Status: DISCONTINUED | OUTPATIENT
Start: 2025-03-28 | End: 2025-03-28

## 2025-03-28 RX ORDER — SODIUM CHLORIDE 0.9 % (FLUSH) 0.9 %
5-40 SYRINGE (ML) INJECTION EVERY 12 HOURS SCHEDULED
Status: DISCONTINUED | OUTPATIENT
Start: 2025-03-28 | End: 2025-03-28 | Stop reason: HOSPADM

## 2025-03-28 RX ORDER — GABAPENTIN 300 MG/1
600 CAPSULE ORAL 2 TIMES DAILY
Status: DISCONTINUED | OUTPATIENT
Start: 2025-03-28 | End: 2025-04-01

## 2025-03-28 RX ORDER — IPRATROPIUM BROMIDE AND ALBUTEROL SULFATE 2.5; .5 MG/3ML; MG/3ML
1 SOLUTION RESPIRATORY (INHALATION)
Status: DISCONTINUED | OUTPATIENT
Start: 2025-03-28 | End: 2025-03-28 | Stop reason: HOSPADM

## 2025-03-28 RX ORDER — GABAPENTIN 300 MG/1
300 CAPSULE ORAL 3 TIMES DAILY
Status: DISCONTINUED | OUTPATIENT
Start: 2025-03-28 | End: 2025-03-28

## 2025-03-28 RX ORDER — AMIODARONE HYDROCHLORIDE 100 MG/1
100 TABLET ORAL DAILY
COMMUNITY

## 2025-03-28 RX ORDER — ONDANSETRON 2 MG/ML
INJECTION INTRAMUSCULAR; INTRAVENOUS
Status: DISCONTINUED | OUTPATIENT
Start: 2025-03-28 | End: 2025-03-28 | Stop reason: SDUPTHER

## 2025-03-28 RX ORDER — CALCIUM ACETATE 667 MG/1
667 CAPSULE ORAL EVERY 8 HOURS
Status: DISCONTINUED | OUTPATIENT
Start: 2025-03-28 | End: 2025-04-02 | Stop reason: HOSPADM

## 2025-03-28 RX ORDER — ROSUVASTATIN CALCIUM 10 MG/1
10 TABLET, COATED ORAL DAILY
Status: DISCONTINUED | OUTPATIENT
Start: 2025-03-28 | End: 2025-03-28

## 2025-03-28 RX ORDER — DROPERIDOL 2.5 MG/ML
0.62 INJECTION, SOLUTION INTRAMUSCULAR; INTRAVENOUS
Status: DISCONTINUED | OUTPATIENT
Start: 2025-03-28 | End: 2025-03-28 | Stop reason: HOSPADM

## 2025-03-28 RX ORDER — MIDAZOLAM HYDROCHLORIDE 1 MG/ML
2 INJECTION, SOLUTION INTRAMUSCULAR; INTRAVENOUS
Status: DISCONTINUED | OUTPATIENT
Start: 2025-03-28 | End: 2025-03-28 | Stop reason: HOSPADM

## 2025-03-28 RX ORDER — LABETALOL HYDROCHLORIDE 5 MG/ML
10 INJECTION, SOLUTION INTRAVENOUS
Status: DISCONTINUED | OUTPATIENT
Start: 2025-03-28 | End: 2025-03-28 | Stop reason: HOSPADM

## 2025-03-28 RX ADMIN — GABAPENTIN 600 MG: 300 CAPSULE ORAL at 20:53

## 2025-03-28 RX ADMIN — CALCIUM ACETATE 667 MG: 667 CAPSULE ORAL at 12:56

## 2025-03-28 RX ADMIN — HYDROCODONE BITARTRATE AND ACETAMINOPHEN 1 TABLET: 5; 325 TABLET ORAL at 23:00

## 2025-03-28 RX ADMIN — Medication 1 TABLET: at 16:01

## 2025-03-28 RX ADMIN — PROPOFOL 25 MG: 10 INJECTION, EMULSION INTRAVENOUS at 11:25

## 2025-03-28 RX ADMIN — ONDANSETRON 4 MG: 2 INJECTION, SOLUTION INTRAMUSCULAR; INTRAVENOUS at 11:34

## 2025-03-28 RX ADMIN — ROSUVASTATIN CALCIUM 20 MG: 20 TABLET, FILM COATED ORAL at 12:55

## 2025-03-28 RX ADMIN — LIDOCAINE HYDROCHLORIDE 60 MG: 20 INJECTION, SOLUTION INTRAVENOUS at 11:20

## 2025-03-28 RX ADMIN — PANTOPRAZOLE SODIUM 40 MG: 40 TABLET, DELAYED RELEASE ORAL at 12:56

## 2025-03-28 RX ADMIN — DEXAMETHASONE SODIUM PHOSPHATE 10 MG: 10 INJECTION INTRAMUSCULAR; INTRAVENOUS at 11:30

## 2025-03-28 RX ADMIN — FENTANYL CITRATE 25 MCG: 50 INJECTION, SOLUTION INTRAMUSCULAR; INTRAVENOUS at 11:25

## 2025-03-28 RX ADMIN — LIDOCAINE HYDROCHLORIDE 40 MG: 20 INJECTION, SOLUTION INTRAVENOUS at 11:25

## 2025-03-28 RX ADMIN — FENTANYL CITRATE 25 MCG: 50 INJECTION, SOLUTION INTRAMUSCULAR; INTRAVENOUS at 11:35

## 2025-03-28 RX ADMIN — CLOPIDOGREL BISULFATE 75 MG: 75 TABLET, FILM COATED ORAL at 12:55

## 2025-03-28 RX ADMIN — PROPOFOL 25 MG: 10 INJECTION, EMULSION INTRAVENOUS at 11:29

## 2025-03-28 RX ADMIN — Medication 1 TABLET: at 20:53

## 2025-03-28 RX ADMIN — PROPOFOL 150 MG: 10 INJECTION, EMULSION INTRAVENOUS at 11:20

## 2025-03-28 RX ADMIN — LISINOPRIL 40 MG: 20 TABLET ORAL at 15:59

## 2025-03-28 RX ADMIN — MINERAL OIL, WHITE PETROLATUM: .03; .94 OINTMENT OPHTHALMIC at 23:00

## 2025-03-28 RX ADMIN — SODIUM CHLORIDE: 9 INJECTION, SOLUTION INTRAVENOUS at 11:10

## 2025-03-28 RX ADMIN — PROPOFOL 25 MG: 10 INJECTION, EMULSION INTRAVENOUS at 11:34

## 2025-03-28 RX ADMIN — AMIODARONE HYDROCHLORIDE 100 MG: 200 TABLET ORAL at 12:55

## 2025-03-28 RX ADMIN — FENTANYL CITRATE 25 MCG: 50 INJECTION, SOLUTION INTRAMUSCULAR; INTRAVENOUS at 11:20

## 2025-03-28 RX ADMIN — METOPROLOL SUCCINATE 25 MG: 25 TABLET, EXTENDED RELEASE ORAL at 15:59

## 2025-03-28 RX ADMIN — AMLODIPINE BESYLATE 5 MG: 5 TABLET ORAL at 12:56

## 2025-03-28 RX ADMIN — FUROSEMIDE 20 MG: 20 TABLET ORAL at 12:55

## 2025-03-28 RX ADMIN — APIXABAN 5 MG: 5 TABLET, FILM COATED ORAL at 20:53

## 2025-03-28 RX ADMIN — CALCIUM ACETATE 667 MG: 667 CAPSULE ORAL at 17:42

## 2025-03-28 RX ADMIN — FENTANYL CITRATE 25 MCG: 50 INJECTION, SOLUTION INTRAMUSCULAR; INTRAVENOUS at 11:30

## 2025-03-28 ASSESSMENT — PAIN DESCRIPTION - ORIENTATION: ORIENTATION: RIGHT

## 2025-03-28 ASSESSMENT — PAIN SCALES - GENERAL
PAINLEVEL_OUTOF10: 0
PAINLEVEL_OUTOF10: 0
PAINLEVEL_OUTOF10: 6

## 2025-03-28 ASSESSMENT — PAIN - FUNCTIONAL ASSESSMENT: PAIN_FUNCTIONAL_ASSESSMENT: NONE - DENIES PAIN

## 2025-03-28 ASSESSMENT — PAIN DESCRIPTION - LOCATION: LOCATION: FOOT

## 2025-03-28 ASSESSMENT — PAIN DESCRIPTION - DESCRIPTORS: DESCRIPTORS: ACHING;DISCOMFORT;SORE

## 2025-03-28 ASSESSMENT — LIFESTYLE VARIABLES: SMOKING_STATUS: 0

## 2025-03-28 NOTE — H&P
Socioeconomic History    Marital status:      Spouse name: Not on file    Number of children: Not on file    Years of education: Not on file    Highest education level: Not on file   Occupational History    Not on file   Tobacco Use    Smoking status: Never    Smokeless tobacco: Never   Vaping Use    Vaping status: Never Used   Substance and Sexual Activity    Alcohol use: Not Currently    Drug use: No    Sexual activity: Not on file   Other Topics Concern    Not on file   Social History Narrative    Not on file     Social Drivers of Health     Financial Resource Strain: Not on file   Food Insecurity: Not on file   Transportation Needs: Not on file   Physical Activity: Not on file   Stress: Not on file   Social Connections: Not on file   Intimate Partner Violence: Not on file   Housing Stability: Not on file       ROS:  General:   Denies chills, fatigue, fever, malaise, night sweats or weight loss    Psychological:   Denies anxiety, disorientation or hallucinations    ENT:    Denies epistaxis, headaches, vertigo or visual changes    Cardiovascular:   Denies any chest pain, irregular heartbeats, or palpitations. No paroxysmal nocturnal dyspnea.    Respiratory:   Denies shortness of breath, coughing, sputum production, hemoptysis, or wheezing.  No orthopnea.    Gastrointestinal:   Denies nausea, vomiting, diarrhea, or constipation.  Denies any abdominal pain.  Denies change in bowel habits or stools.      Genito-Urinary:    Denies any urgency, frequency, hematuria.  Voiding without difficulty.    Musculoskeletal:   Denies joint pain, joint stiffness, joint swelling or muscle pain;  ++ severe right 2nd toe pain, deformity    Neurology:    Denies any headache or focal neurological deficits. No weakness or paresthesia.    Derm:    Denies any rashes, ulcers, or excoriations.  Denies bruising.      Extremities:   Denies any lower extremity swelling or edema.      PHYSICAL EXAM:  VITALS:  Vitals:    03/27/25 2249  arteries  Patient follows with Dr. Olivo from cardiology for a stress and echo regularly.  Saw him 2 weeks ago        Electronically signed by Syed Cunningham MD on 3/27/2025 at 11:13 PM

## 2025-03-28 NOTE — ANESTHESIA POSTPROCEDURE EVALUATION
Department of Anesthesiology  Postprocedure Note    Patient: Yola Scott  MRN: [id number]  YOB: 1935  Date of evaluation: 3/28/2025    Procedure Summary       Date: 03/28/25 Room / Location: [address]    Anesthesia Start: 1110 Anesthesia Stop: 1204    Procedure: SECOND DIGIT  AMPUTATION RIGHT FOOT (Right: Second Toe) Diagnosis:       Acute osteomyelitis of toe of right foot (HCC)      (Acute osteomyelitis of toe of right foot (HCC) [M86.171])    Surgeons: Tano Esposito DPM Responsible Provider: Tez Fitch MD    Anesthesia Type: MAC ASA Status: 3            Anesthesia Type: No value filed.    Poppy Phase I: Poppy Score: 10    Poppy Phase II:      Anesthesia Post Evaluation    Patient location during evaluation: PACU  Patient participation: complete - patient participated  Level of consciousness: awake  Airway patency: patent  Nausea & Vomiting: no nausea and no vomiting  Cardiovascular status: hemodynamically stable  Respiratory status: acceptable  Hydration status: euvolemic  Pain management: adequate    No notable events documented.

## 2025-03-28 NOTE — PROGRESS NOTES
Pharmacy Note    This patient was ordered Alendronate 70mg. Per the Pharmacy & Therapeutics Committee, this medication is non-formulary and not stocked by pharmacy for the reason indicated below. The medication can be reordered at discharge.     Medications in which risks outweigh benefits during hospitalization:           -  oral bisphosphonates  AYAKA RamosPh. 3/28/305217:01 AM

## 2025-03-28 NOTE — CARE COORDINATION
Case Management Assessment  Initial Evaluation    Date/Time of Evaluation: 3/28/2025 12:20 PM  Assessment Completed by: GLORIA Hawkins    If patient is discharged prior to next notation, then this note serves as note for discharge by case management.    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 Yes    Chart Reviewed: Yes      History Provided by: Patient  Patient Orientation: Alert and Oriented, Person, Situation, Place    Patient Cognition: Alert    Hospitalization in the last 30 days (Readmission):  No    If yes, Readmission Assessment in CM Navigator will be completed.    Advance Directives:      Code Status: Full Code   Patient's Primary Decision Maker is: Patient Declined (Legal Next of Kin Remains as Decision Maker) (Per pt, Gely is HCPOA, requested doc, not currently on file.)    Primary Decision Maker: Gely Johnson - Child - [phone]    Discharge Planning:    Patient lives with: Alone Type of Home: House  Primary Care Giver: Self  Patient Support Systems include: Children, Family Members, Home Care Staff   Current Financial resources:    Current community resources:    Current services prior to admission: Home Care            Current DME:              Type of Home Care services:  Aide Services, Nursing Services    ADLS  Prior functional level: Independent in ADLs/IADLs  Current functional level: Assistance with the following:, Other (see comment), Shopping (transportation assistance needed  until she is at baseline. Pt reported having walker w/  & without wheels if needed. Pt has hx of Tye many years ago when she received hip replacement. Dr. Sloan is pcp & Dr. Esposito is podiatrist. [facility] is mail order pharmacy & [facility] pharmacy for immediate needs. Family to provide transport upon d/c. Electronically signed by GLORIA Hawkins on 3/28/2025 at 12:36 PM      GLORIA Hawkins  Case Management Department

## 2025-03-28 NOTE — PROGRESS NOTES
Meds reviewed with pt.  Pt doses to listed meds are inconsistent with what has been recently prescribed.  Pharmacist previously aware.  Meds presently locked until they can be confirmed.

## 2025-03-28 NOTE — BRIEF OP NOTE
Brief Postoperative Note      Patient: Yola Scott  YOB: 1935  MRN: [id number]    Date of Procedure: 3/28/2025    Pre-Op Diagnosis Codes:      * Acute osteomyelitis of toe of right foot ([facility]) [M86.171]    Post-Op Diagnosis: Same       Procedure(s):  SECOND DIGIT  AMPUTATION RIGHT FOOT    Surgeon(s):  Tano Esposito DPM    Assistant:  Resident: Karen Garcia DPM    Anesthesia: Monitor Anesthesia Care    Estimated Blood Loss (mL): Minimal    Complications: None    Specimens:   ID Type Source Tests Collected by Time Destination   1 : SWAB RIGHT 2ND DIGIT Tissue Tissue CULTURE, ANAEROBIC, CULTURE, FUNGUS, GRAM STAIN, CULTURE, SURGICAL, CULTURE WITH SMEAR, ACID FAST BACILLIUS Tano Esposito DPM 3/28/2025 1130    A : RIGHT 2ND DIGIT Tissue Tissue SURGICAL PATHOLOGY Tano Esposito DPM 3/28/2025 1128        Implants:  * No implants in log *      Drains: * No LDAs found *    Findings:  Infection Present At Time Of Surgery (PATOS) (choose all levels that have infection present):  - Deep Infection (muscle/fascia) present as evidenced by fluid consistent with infection  Other Findings: necrosis right 2nd toe    Electronically signed by Tano Esposito DPM on 3/28/2025 at 11:41 AM

## 2025-03-28 NOTE — PLAN OF CARE
Problem: Chronic Conditions and Co-morbidities  Goal: Patient's chronic conditions and co-morbidity symptoms are monitored and maintained or improved  3/28/2025 1105 by Kimberley Alvarado, RN  Outcome: Progressing  3/28/2025 1047 by Kimberley Alvarado, RN  Outcome: Progressing

## 2025-03-28 NOTE — PROGRESS NOTES
This patient is on medication that requires renal, weight, and/or indication dose adjustment.      Date Body Weight IBW  Adjusted BW SCr  CrCl Dialysis status   3/28/2025 75.8 kg (167 lb) Ideal body weight: 59.3 kg (130 lb 11.7 oz)  Adjusted ideal body weight: 65.9 kg (145 lb 3.8 oz) Serum creatinine: 1.3 mg/dL (H) 03/27/25 2000  Estimated creatinine clearance: 31 mL/min (A) N/a       Pharmacy has dose-adjusted the following medication(s):    Date Previous Order Adjusted Order   3/28/2025 Gabapentin 600mg twice daily Gabapentin 300mg three times a day       These changes were made per protocol according to the [facility]   Automatic Renal Dose Adjustment Policy.     *Please note this dose may need readjusted if patient's condition changes.    Please contact pharmacy with any questions regarding these changes.    Flip Luque RPH  3/28/2025  12:47 AM

## 2025-03-28 NOTE — OP NOTE
[address] [address]                            OPERATIVE REPORT      PATIENT NAME: PHAM KOWALSKI             : 1935  MED REC NO: [id number]                        ROOM: [facility]  ACCOUNT NO: [id number]                       ADMIT DATE: 2025  PROVIDER: Tano Esposito DPM      DATE OF PROCEDURE:  2025    SURGEON:  Tano Esposito DPM    PREOPERATIVE DIAGNOSIS:  Acute osteomyelitis, right 2nd toe.    POSTOPERATIVE DIAGNOSES:  Acute osteomyelitis, right 2nd toe.    PROCEDURE:  Amputation of the right 2nd toe to the level of metatarsophalangeal joint.    ANESTHESIA:  Monitored anesthesia care.    INJECTABLES:  20 mL of 0.5% Marcaine plain.    COMPLICATIONS:  None.    HEMOSTASIS:  On the field.    ESTIMATED BLOOD LOSS:  Minimal.    ASSISTANT:  Karen Garcia.    INTRAOPERATIVE FINDINGS:  Necrosis of soft tissue and bone, right 2nd toe.    MATERIALS USED:  3-0 nylon suture.    SPECIMENS:  Right 2nd toe for pathological and microbiological examination.    INDICATIONS:  This is a pleasant 89-year-old female, presenting for right 2nd toe amputation.  Counseled the patient on the nature of the problem; proposed course of the procedure; potential benefits, risks, complications, and convalescence in detail.  All questions were answered to the patient's satisfaction.  No guarantees were given as to the outcome of procedure.    DESCRIPTION OF PROCEDURE:  Under mild sedation, the patient was brought to the operating room and placed on the operating table in supine position.  The right lower extremity was scrubbed, prepped, and draped in usual sterile fashion.  At this time, using a #10 blade, I performed a linear incision on the dorsal aspect of the right 2nd metatarsophalangeal joint, extended to the base of the toe.  Next, at this point, I performed medial and lateral incisions.  This incision was placed dorsally.  At this  point, performed medial and lateral incisions at that level connecting both incisions to the plantar aspect of the digit, then extended that incision 1.5 cm proximal.  Full-thickness dissection was then performed to disarticulate the toe at the metatarsophalangeal joint.  The toe was sent off for pathological and microbiological examination.  Next, I irrigated the area using copious amounts of normal saline.  Next, I closed that incision using 3-0 nylon suture.  Excellent closure at this time.  Capillary refill was brisk to the area postoperative.  Postoperative bandage was then applied.  The patient overall tolerated the procedure and anesthesia well, appeared in satisfactory condition.  Vital signs stable.  Vascular status intact to the right lower extremity.  The patient was transferred to PACU for further monitoring prior to readmission to the nursing floor.          MITCH BIRD DPM      D:  03/28/2025 11:44:29     T:  03/28/2025 12:10:24     SAM/LION  Job #:  [id number]     Doc#:  [id number]

## 2025-03-28 NOTE — PROGRESS NOTES
4 Eyes Skin Assessment     NAME:  Yola Scott  YOB: 1935  MEDICAL RECORD NUMBER:  [id number]    The patient is being assessed for  Admission    I agree that at least one RN has performed a thorough Head to Toe Skin Assessment on the patient. ALL assessment sites listed below have been assessed.      Areas assessed by both nurses:    Head, Face, Ears, Shoulders, Back, Chest, Arms, Elbows, Hands, Sacrum. Buttock, Coccyx, Ischium, and Legs. Feet and Heels        Does the Patient have a Wound? Yes wound(s) were present on assessment. LDA wound assessment was Initiated and completed by RN   R foot- 2nd toe   L medial foot   R lateral foot    Kris Prevention initiated by RN: No  Wound Care Orders initiated by RN: No    Pressure Injury (Stage 3,4, Unstageable, DTI, NWPT, and Complex wounds) if present, place Wound referral order by RN under : No    New Ostomies, if present place, Ostomy referral order under : No     Nurse 1 eSignature: Electronically signed by Kamron Ramos RN on 3/28/25 at 4:02 AM EDT    **SHARE this note so that the co-signing nurse can place an eSignature**    Nurse 2 eSignature: Electronically signed by Cali Benavides RN on 3/28/25 at 4:05 AM EDT

## 2025-03-28 NOTE — CONSULTS
Reason for Consult:  wound to second digit, infection    CHIEF COMPLAINT:  hammertoe with wound, right foot    HISTORY OF PRESENT ILLNESS:                The patient is a 89 y.o. female with significant past medical history of CAD, hypertension, diabetes mellitus with peripheral neuropathy who reports as consult service for infected second digit wound to the right foot.  Patient seen with family at bedside.  Patient and family admits that patient knew that this was an issue for some time.  Patient states that for wound to left foot recently second toe right has worsened.  Patient amenable to any podiatric intervention at this time.  Patient is willing to go to surgery tomorrow with Dr. Esposito this time.  Patient is fearful of proximal amputation and does not want this to happen.  Patient denies constitutional symptoms.  Patient denies any N/V/D/F/C/SOB/CP and has no other pedal complaints at this time.     Past Medical History:        Diagnosis Date    Asthma     CAD (coronary artery disease)     Cancer (HCC)     melanoma  legs    Diabetes mellitus (HCC)     Gastroesophageal reflux disease without esophagitis     H/O cardiovascular stress test 02/14/2020    Heart attack (HCC) 01/2013    Hyperlipidemia     Hypertension     Neuropathy     Wears hearing aid        Past Surgical History:        Procedure Laterality Date    BACK SURGERY  2007    lumbar    BLADDER SUSPENSION  2002    COLONOSCOPY      CORONARY ANGIOPLASTY WITH STENT PLACEMENT  01/2013    3.0 x 20 mm in LAD   x1 stent    DIAGNOSTIC CARDIAC CATH LAB PROCEDURE      EYE SURGERY      cataract ghada eyes    HYSTERECTOMY (CERVIX STATUS UNKNOWN)  1983    JOINT REPLACEMENT Bilateral     knees    KNEE SURGERY Bilateral     LEG BIOPSY EXCISION Left 03/16/2021    EXCISION LEFT LATERAL LEG SQUAMOUS CELL CARCINOMA, FULL THICKNESS SKIN GRAFT --EXCISIONAL BIOPSY WITH COMPLEX CLOSURE performed by Ji Torres MD at [facility]    LEG BIOPSY EXCISION Right 09/02/2021       Full-thickness ulceration, right second toe, infected, POA  Crossover toe deformity, right foot  Cellulitis of second toe right foot  Possible acute osteomyelitis, right foot, second digit  Peripheral arterial disease    PLAN:    Patient was examined and evaluated.  - Reviewed patient's recent lab results and pertinent diagnostic imaging.  - Reviewed ancillary service notes.  - Will likely need ID consult for antibiotic post surgery, follow cultures  - WBC: Pending, ESR: Pending, CRP: Pending  - Cultures : Wound culture obtained right foot second toe  - Vascular: Diminished pulses, bilaterally, arterial PPG lower extremities ordered last week.  - X-rays: Right foot x-ray exhibits soft tissue swelling of the second and third digits diffuse soft tissue swelling dorsal aspect of metatarsal and base of digits.  Chronic periostitis of distal tuft of second digit.  CT scan left foot ordered to rule out osteomyelitis/gas.  - Dressing: Betadine DSD bilaterally wounds applied  - Podiatry Recommendations: Patient will likely need second digit amputation of the right foot, open. Patient understands that she does not want proximal amputation.  Patient is amenable to procedure tomorrow.  Patient scheduled for second digit right amputation of right foot.  N.p.o. placed for midnight 3/28 at 0015     - discussed patient with Dr. Tano Esposito DPM FACFAS  Fellowship-Trained Foot and Ankle Surgeon  Diplomate, [employer] Board of Foot and Ankle Surgeons  [phone]     - Will continue to follow patient while they are in-house.    Thank you for the opportunity to take part in the patient's care. Please do not hesitate to call for any questions or concerns.    Stevo Tan DPM, PGY1

## 2025-03-28 NOTE — PROGRESS NOTES
Pharmacy Note    This patient was ordered Vit E 1000 units. Per the Pharmacy & Therapeutics Committee, this medication is non-formulary and not stocked by pharmacy for the reason indicated below. The medication can be reordered at discharge. FROILAN Ramos.Ph. 3/28/075620:56 AM

## 2025-03-28 NOTE — ACP (ADVANCE CARE PLANNING)
Advance Care Planning   Healthcare Decision Maker:    Primary Decision Maker: Gely Johnson - Child - [phone]    Per pt, Gely is HCPOA, requested doc, not currently on file. Electronically signed by GLORIA Hawkins on 3/28/2025 at 12:15 PM      Click here to complete Healthcare Decision Makers including selection of the Healthcare Decision Maker Relationship (ie \"Primary\").

## 2025-03-28 NOTE — ED NOTES
Report called.  Supervisor notified of admitting order issue.   Told to bring patient up if patient has room.

## 2025-03-28 NOTE — ANESTHESIA PRE PROCEDURE
Department of Anesthesiology  Preprocedure Note       Name:  Yola Scott   Age:  89 y.o.  :  1935                                          MRN:  [id number]         Date:  3/28/2025      Surgeon: Surgeon(s):  Tano Esposito DPM    Procedure: Procedure(s):  SECOND DIGIT  AMPUTATION RIGHT FOOT    Medications prior to admission:   Prior to Admission medications    Medication Sig Start Date End Date Taking? Authorizing Provider   furosemide (LASIX) 20 MG tablet Take 1 tablet by mouth daily   Yes Nik Roman MD   amiodarone (PACERONE) 100 MG tablet Take 1 tablet by mouth daily   Yes ProviderNik MD   amLODIPine (NORVASC) 5 MG tablet Take 1 tablet by mouth daily   Yes Nik Roman MD   calcium acetate 667 MG TABS Take 667 mg by mouth in the morning and 667 mg at noon and 667 mg in the evening.   Yes Nik Roman MD   apixaban (ELIQUIS) 5 MG TABS tablet Take 1 tablet by mouth 2 times daily   Yes Nik Roman MD   clopidogrel (PLAVIX) 75 MG tablet Take 1 tablet by mouth daily   Yes Nik Roman MD   rosuvastatin (CRESTOR) 20 MG tablet Take 1 tablet by mouth daily 21  Yes ProviderNik MD   lisinopril (PRINIVIL;ZESTRIL) 40 MG tablet Take 1 tablet by mouth 2 times daily   Yes Nik Roman MD   gabapentin (NEURONTIN) 600 MG tablet Take 1 tablet by mouth 2 times daily.   Yes Nik Roman MD   alendronate (FOSAMAX) 70 MG tablet Take 1 tablet by mouth Once a week 12  Yes Nik Roman MD   metoprolol (TOPROL-XL) 25 MG XL tablet Take 1 tablet by mouth 2 times daily AM 13  Yes Nik Roman MD   vitamin E 1000 units capsule Take 1 capsule by mouth daily    Nik Roman MD   Multiple Vitamins-Minerals (CENTRUM SILVER PO) Take by mouth daily    Nik Roman MD   Multiple Vitamins-Minerals (AIRBORNE GUMMIES PO) Take by mouth    Nik Roman MD   calcium citrate-vitamin D (CITRICAL + D)

## 2025-03-29 PROCEDURE — 2500000003 HC RX 250 WO HCPCS: Performed by: INTERNAL MEDICINE

## 2025-03-29 PROCEDURE — 6370000000 HC RX 637 (ALT 250 FOR IP): Performed by: INTERNAL MEDICINE

## 2025-03-29 PROCEDURE — 6370000000 HC RX 637 (ALT 250 FOR IP): Performed by: SPECIALIST

## 2025-03-29 PROCEDURE — 2060000000 HC ICU INTERMEDIATE R&B

## 2025-03-29 RX ORDER — LINEZOLID 600 MG/1
600 TABLET, FILM COATED ORAL EVERY 12 HOURS SCHEDULED
Status: DISCONTINUED | OUTPATIENT
Start: 2025-03-29 | End: 2025-03-31

## 2025-03-29 RX ADMIN — METOPROLOL SUCCINATE 25 MG: 25 TABLET, EXTENDED RELEASE ORAL at 09:48

## 2025-03-29 RX ADMIN — CLOPIDOGREL BISULFATE 75 MG: 75 TABLET, FILM COATED ORAL at 09:48

## 2025-03-29 RX ADMIN — PANTOPRAZOLE SODIUM 40 MG: 40 TABLET, DELAYED RELEASE ORAL at 09:48

## 2025-03-29 RX ADMIN — Medication 1 TABLET: at 18:07

## 2025-03-29 RX ADMIN — LINEZOLID 600 MG: 600 TABLET, FILM COATED ORAL at 22:48

## 2025-03-29 RX ADMIN — FUROSEMIDE 20 MG: 20 TABLET ORAL at 09:47

## 2025-03-29 RX ADMIN — Medication 1 TABLET: at 09:47

## 2025-03-29 RX ADMIN — AMLODIPINE BESYLATE 5 MG: 5 TABLET ORAL at 09:48

## 2025-03-29 RX ADMIN — APIXABAN 5 MG: 5 TABLET, FILM COATED ORAL at 22:48

## 2025-03-29 RX ADMIN — CALCIUM ACETATE 667 MG: 667 CAPSULE ORAL at 09:47

## 2025-03-29 RX ADMIN — LISINOPRIL 40 MG: 20 TABLET ORAL at 09:48

## 2025-03-29 RX ADMIN — ROSUVASTATIN CALCIUM 20 MG: 20 TABLET, FILM COATED ORAL at 09:47

## 2025-03-29 RX ADMIN — CALCIUM ACETATE 667 MG: 667 CAPSULE ORAL at 18:07

## 2025-03-29 RX ADMIN — APIXABAN 5 MG: 5 TABLET, FILM COATED ORAL at 09:49

## 2025-03-29 RX ADMIN — GABAPENTIN 600 MG: 300 CAPSULE ORAL at 22:48

## 2025-03-29 RX ADMIN — GABAPENTIN 600 MG: 300 CAPSULE ORAL at 09:48

## 2025-03-29 RX ADMIN — CALCIUM ACETATE 667 MG: 667 CAPSULE ORAL at 02:27

## 2025-03-29 NOTE — PROGRESS NOTES
Department of Podiatry  Progress Note    SUBJECTIVE:  Patient seen bedside for s/p amputation of second right toe (DOS: 3/28/2025). No acute events overnight.  Patient states that she is having some right shoulder pain but other than that is feeling well and hoping that her amputation went well.  Patient denies any N/V/D/F/C/SOB/CP and has no other pedal complaints at this time.     OBJECTIVE:    Scheduled Meds:   linezolid  600 mg Oral 2 times per day    amiodarone  100 mg Oral Daily    amLODIPine  5 mg Oral Daily    apixaban  5 mg Oral BID    calcium acetate  667 mg Oral Q8H    clopidogrel  75 mg Oral Daily    furosemide  20 mg Oral Daily    rosuvastatin  20 mg Oral Daily    therapeutic multivitamin-minerals   Oral Daily    pantoprazole  40 mg Oral Daily    gabapentin  600 mg Oral BID    calcium-cholecalciferol  1 tablet Oral BID WC    lisinopril  40 mg Oral Daily    metoprolol succinate  25 mg Oral Daily     Continuous Infusions:  PRN Meds:.artificial tears, HYDROcodone 5 mg - acetaminophen    Allergies   Allergen Reactions    Ketamine Other (See Comments)     \"weird\" hallucinate    Feldene [Piroxicam] Other (See Comments)     \"weird \"    Oxycontin [Oxycodone Hcl] Other (See Comments)     \"weird \"    Pcn [Penicillins] Rash    Sulfa Antibiotics Other (See Comments)     Unsure of reaction due to long ago       BP (!) 120/90   Pulse 61   Temp (!) 96.6 °F (35.9 °C) (Axillary)   Resp 18   Ht 1.676 m (5' 6\")   Wt 75.8 kg (167 lb)   SpO2 96%   BMI 26.95 kg/m²       EXAM:    CFT less than 3 seconds x 9 digits.  Amputation site and incision site appear to be healing well.  No signs of dehiscence at this time.  Sutures well coapted.    Gross light touch mildly diminished, epicritic sensation mildly diminished    Previous LOWER EXTREMITY EXAMINATION      VASCULAR:  DP faintly palpable, 1/4, PT pulses nonpalpable 0/4. CFT <3 seconds B/L.  Warm to warm from the tibial tuberosity to the distal aspect of the digits  CONTRAST   Final Result   1. Findings consistent with osteomyelitis involving the distal aspect of the   2nd digit proximal phalanx with dislocation at the PIP joint.   2. Subacute or chronic fracture and/or osteolysis of the 4th distal phalanx.   3. Subluxation at the 3rd digit PIP joint without definite osteolysis or   periostitis.   4. Edema and/or cellulitis of the forefoot and ankle.   5. Degenerative changes at the tarsometatarsal and intertarsal joints of the   right midfoot.         XR FOOT RIGHT (MIN 3 VIEWS)   Final Result   1.  Severe however toe like deformity for the 2nd toe, 3rd toe and in lesser   degree for the 4th and 5th toes.      2.  More dominant soft tissue swelling is seen in the 2nd and in the 3rd   also, in addition to the diffuse soft tissue swelling in the dorsal aspect of   the metatarsal region extending to the base of toes.      3.  Also soft tissue swelling is seen along the medial aspect of the base of   the great toe to be correlate clinically.      4.  Degenerative changes are seen predominant in the tarsal-metatarsal joints   at multiple levels.         Vascular lower extremity arterial segmental pressures w PPG    (Results Pending)     BP (!) 120/90   Pulse 61   Temp (!) 96.6 °F (35.9 °C) (Axillary)   Resp 18   Ht 1.676 m (5' 6\")   Wt 75.8 kg (167 lb)   SpO2 96%   BMI 26.95 kg/m²     LABS:    Recent Labs     03/27/25 2000 03/28/25  0752   WBC 8.6 7.2   HGB 11.2* 10.7*   HCT 34.6 33.1*    207        Recent Labs     03/28/25  0752      K 3.9      CO2 24   BUN 21   CREATININE 0.9      No results for input(s): \"INR\", \"APTT\" in the last 72 hours.    Invalid input(s): \"PROT\"      ASSESSMENT:  S/p amputation of second digit right  Full-thickness ulceration, right second toe, infected, POA, necrosis of bone  Previous Crossover toe deformity, right foot  Cellulitis of second toe right foot  Possible acute osteomyelitis, right foot, second digit  Peripheral

## 2025-03-29 NOTE — PROGRESS NOTES
Primary Care Physician: Alfred Lopez MD   Admitting Physician:  Syed Cunningham MD  Admission date and time: 3/27/2025  7:37 PM    Room:  [address] [place] [address]  Admitting diagnosis: Hammer toe of right foot [M20.41]  Pressure ulcer of toe of right foot, stage 3 (HCC) [L89.893]  Diabetic ulcer of toe of right foot associated with diabetes mellitus due to underlying condition, with fat layer exposed [E08.621, L97.512]    Patient Name: Yola Scott  MRN: [id number]    Date of Service: 3/29/2025     Subjective:  Yola Guzman is a 89 y.o. female who was seen and examined today,3/29/2025, at the bedside.  Did complain of postop pain better with medication no fever chills    family present during my examination.    ROS:  General:   Denies chills, fatigue, fever, malaise, night sweats or weight loss     Psychological:   Denies anxiety, disorientation or hallucinations     ENT:    Denies epistaxis, headaches, vertigo or visual changes     Cardiovascular:   Denies any chest pain, irregular heartbeats, or palpitations. No paroxysmal nocturnal dyspnea.     Respiratory:   Denies shortness of breath, coughing, sputum production, hemoptysis, or wheezing.  No orthopnea.     Gastrointestinal:   Denies nausea, vomiting, diarrhea, or constipation.  Denies any abdominal pain.  Denies change in bowel habits or stools.       Genito-Urinary:    Denies any urgency, frequency, hematuria.  Voiding without difficulty.     Musculoskeletal:   Denies joint pain, joint stiffness, joint swelling or muscle pain; s/p amputation right 2nd toe     Neurology:    Denies any headache or focal neurological deficits. No weakness or paresthesia.     Derm:    Denies any rashes, ulcers, or excoriations.  Denies bruising.      Extremities:   Denies any lower extremity swelling or edema.            Physical Exam:  No intake/output data recorded.    Intake/Output Summary (Last 24 hours) at 3/29/2025 1032  Last data filed at 3/28/2025 1256  Gross per 24 hour        Assessment:    Osteomyelitis right foot 2 nd toe S/p Amputation  Pressure ulcer of toe of right foot, stage 3  Diabetic ulcer of toe of right foot associated with diabetes mellitus  Hammer toe right foot 2nd toe  Diabetes mellitus  Hypertension  Hyperlipidemia  Hypothyroid    Plan:     Admit to intermediate floor  Consult podiatry Dr. Esposito  Continue home medications  S/p Amputation 2nd toe right foot  Consult Infectious disease  Hold anticoagulants and antiplatelet agents will restart the day after surgery  DVT GI prophylaxis  PT OT  Wound care nurse consult  Ultrasound of the carotid bilateral revealed sonographic estimate of less than 50% stenosis proximal R ICA and  L ICA, antegrade flow in the vertebral arteries  Patient follows with Dr. Olivo from cardiology for a stress and echo regularly.  Saw him 2 weeks ago  Continue current therapy.  See orders for further plan of care.  Continue current therapy.  See orders for further plan of care.      More than 50% of my time was spent at the bedside counseling/coordinating care with the patient and/or family with face to face contact.  This time was spent reviewing notes and laboratory data as well as instructing and counseling the patient. Time I spent with the family or surrogate(s) is included only if the patient was incapable of providing the necessary information or participating in medical decisions. I also discussed the differential diagnosis and all of the proposed management plans with the patient and individuals accompanying the patient. I am readily available for any further decision-making and intervention.       Electronically signed by Syed Cunningham MD on 3/29/2025 at 10:32 AM

## 2025-03-29 NOTE — PLAN OF CARE
Problem: Chronic Conditions and Co-morbidities  Goal: Patient's chronic conditions and co-morbidity symptoms are monitored and maintained or improved  3/28/2025 2335 by Kamron Ramos RN  Outcome: Progressing  3/28/2025 1105 by Kimberley Alvarado RN  Outcome: Progressing  3/28/2025 1047 by Kimberley Alvarado RN  Outcome: Progressing     Problem: Discharge Planning  Goal: Discharge to home or other facility with appropriate resources  3/28/2025 2335 by Kamron Ramos RN  Outcome: Progressing  3/28/2025 1105 by Kimberley Alvarado RN  Outcome: Progressing  3/28/2025 1047 by Kimberley Alvarado RN  Outcome: Progressing     Problem: ABCDS Injury Assessment  Goal: Absence of physical injury  3/28/2025 2335 by Kamron Ramos RN  Outcome: Progressing  3/28/2025 1105 by Kimberley Alvarado RN  Outcome: Progressing  3/28/2025 1047 by Kimberley Alvarado RN  Outcome: Progressing     Problem: Safety - Adult  Goal: Free from fall injury  3/28/2025 2335 by Kamron Ramos RN  Outcome: Progressing  3/28/2025 1105 by Kimberley Alvarado RN  Outcome: Progressing  3/28/2025 1047 by Kimberley Alvarado RN  Outcome: Progressing      [Follow-Up: _____] : a [unfilled] follow-up visit

## 2025-03-29 NOTE — CONSULTS
CONSULTATION NOTE :ID     Patient - Yola Scott,  Age - 89 y.o.    - 1935      Room Number - 0538/0538-02   MRN -  [id number]   [id number]  Date of Admission -  3/27/2025  7:37 PM  Patient's PCP: Alfred Lopez MD     Requesting Physician: Syed Cunningham MD    HISTORY OF PRESENT ILLNESS   This is a 89 y.o. female who was admitted on 3/27/2025   to the hospital with a chief complaints of   Chief Complaint   Patient presents with    Toe Pain     Pt states that she has a hammer tow on her right foot, and Dr. Esposito wanted her to come in      ID was consulted on 25 for antibiotic management   UNKnonw to ID   Pt came in with infected second digit right toe  Present for awhile progressed to infection   S/p toe amp   Pt denies f/c/n/v/d  No recent atbx     CT FOOT RIGHT WO CONTRAST  Result Date: 3/28/2025  1. Findings consistent with osteomyelitis involving the distal aspect of the 2nd digit proximal phalanx with dislocation at the PIP joint. 2. Subacute or chronic fracture and/or osteolysis of the 4th distal phalanx. 3. Subluxation at the 3rd digit PIP joint without definite osteolysis or periostitis. 4. Edema and/or cellulitis of the forefoot and ankle. 5. Degenerative changes at the tarsometatarsal and intertarsal joints of the right midfoot.     XR FOOT RIGHT (MIN 3 VIEWS)  Result Date: 3/27/2025  1.  Severe however toe like deformity for the 2nd toe, 3rd toe and in lesser degree for the 4th and 5th toes. 2.  More dominant soft tissue swelling is seen in the 2nd and in the 3rd also, in addition to the diffuse soft tissue swelling in the dorsal aspect of the metatarsal region extending to the base of toes. 3.  Also soft tissue swelling is seen along the medial aspect of the base of the great toe to be correlate clinically. 4.  Degenerative changes are seen predominant in the tarsal-metatarsal joints at multiple levels.              PAST MEDICAL AND SURGICAL  Oral Daily    metoprolol succinate  25 mg Oral Daily     Continuous Infusions:  PRN Meds:artificial tears, HYDROcodone 5 mg - acetaminophen    ALLERGIES:    Ketamine, Feldene [piroxicam], Oxycontin [oxycodone hcl], Pcn [penicillins], and Sulfa antibiotics  SOCIAL HISTORY:   TOBACCO:   reports that she has never smoked. She has never used smokeless tobacco.     ETOH:   reports that she does not currently use alcohol.  Patient currently lives alone    FAMILY HISTORY:         Problem Relation Age of Onset    No Known Problems Mother     Heart Attack Father        REVIEW OF SYSTEMS:     Constitutional: no fever, no night sweats, no fatigue.  Head: no head ache , no head injury, no migranes.  Eye: no blurring of vision, no double vision.  Ears: no hearing difficulty, no tinnitus  Mouth/throat: no ulceration, dental caries , dysphagia  Lungs: no cough no chest pain  CVS: no palpitation, no chest pain, no shortness of breath  GI: no abdominal pain, no nausea , no vomiting, no constipation  LELE: no dysuria, frequency and urgency, no hematuria, no kidney stones  Musculoskeletal: no joint pain, swelling , stiffness,  Endocrine: no polyuria, polydipsia, no cold or heat intolerance  Hematology: no anemia, no easy brusing or bleeding, no hx of clotting disorder  Dermatology: no skin rash, no eczema, no pruritis,  Psychiatry: no depression, no anxiety,no panic attacks, no suicide ideation    PHYSICAL EXAM:     BP (!) 120/90   Pulse 61   Temp (!) 96.6 °F (35.9 °C) (Axillary)   Resp 18   Ht 1.676 m (5' 6\")   Wt 75.8 kg (167 lb)   SpO2 96%   BMI 26.95 kg/m²   General:    Comfortable    HEENT:   AT/NC     Cardiovascular:   RRR ,S1S2,   Lungs:   CTAB  ant   Abdomen:    Soft, non tender to palpation.  : foot dressed  Extremities:    No edema FROM foot dressed  Skin:     Warm and dry. No rash   CNS:    Awake and alert  NAD  Lines:          Peripheral Intravenous Line:  Peripheral IV 03/27/25 Right Antecubital (Active)   Site

## 2025-03-29 NOTE — PROGRESS NOTES
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 female who was seen and examined today,3/28/25, at the bedside. Patient waiting for surgery c/o pain and swelling rt foot    No family present during my examination.    ROS:  General:   Denies chills, fatigue, fever, malaise, night sweats or weight loss     Psychological:   Denies anxiety, disorientation or hallucinations     ENT:    Denies epistaxis, headaches, vertigo or visual changes     Cardiovascular:   Denies any chest pain, irregular heartbeats, or palpitations. No paroxysmal nocturnal dyspnea.     Respiratory:   Denies shortness of breath, coughing, sputum production, hemoptysis, or wheezing.  No orthopnea.     Gastrointestinal:   Denies nausea, vomiting, diarrhea, or constipation.  Denies any abdominal pain.  Denies change in bowel habits or stools.       Genito-Urinary:    Denies any urgency, frequency, hematuria.  Voiding without difficulty.     Musculoskeletal:   Denies joint pain, joint stiffness, joint swelling or muscle pain;  ++ severe right 2nd toe pain, deformity     Neurology:    Denies any headache or focal neurological deficits. No weakness or paresthesia.     Derm:    Denies any rashes, ulcers, or excoriations.  Denies bruising.      Extremities:   Denies any lower extremity swelling or edema.            Physical Exam:  No intake/output data recorded.    Intake/Output Summary (Last 24 hours) at 3/29/2025 1021  Last data filed at 3/28/2025 1256  Gross per 24 hour  lisinopril  40 mg Oral Daily    metoprolol succinate  25 mg Oral Daily     Continuous Infusions:    Objective Data:  CBC with Differential:    Lab Results   Component Value Date/Time    WBC 7.2 03/28/2025 07:52 AM    RBC 3.69 03/28/2025 07:52 AM    HGB 10.7 03/28/2025 07:52 AM    HCT 33.1 03/28/2025 07:52 AM     03/28/2025 07:52 AM    MCV 89.7 03/28/2025 07:52 AM    MCH 29.0 03/28/2025 07:52 AM    MCHC 32.3 03/28/2025 07:52 AM    RDW 15.4 03/28/2025 07:52 AM    LYMPHOPCT 15 03/28/2025 07:52 AM    MONOPCT 12 03/28/2025 07:52 AM    EOSPCT 3 03/28/2025 07:52 AM    BASOPCT 1 03/28/2025 07:52 AM    MONOSABS 0.85 03/28/2025 07:52 AM    LYMPHSABS 1.09 03/28/2025 07:52 AM    EOSABS 0.19 03/28/2025 07:52 AM    BASOSABS 0.04 03/28/2025 07:52 AM     WBC:    Lab Results   Component Value Date/Time    WBC 7.2 03/28/2025 07:52 AM     CMP:    Lab Results   Component Value Date/Time     03/28/2025 07:52 AM    K 3.9 03/28/2025 07:52 AM    K 4.1 06/10/2021 02:09 PM     03/28/2025 07:52 AM    CO2 24 03/28/2025 07:52 AM    BUN 21 03/28/2025 07:52 AM    CREATININE 0.9 03/28/2025 07:52 AM    GFRAA >60 08/10/2022 10:44 AM    LABGLOM 59 03/28/2025 07:52 AM    GLUCOSE 112 03/28/2025 07:52 AM    CALCIUM 9.1 03/28/2025 07:52 AM    BILITOT 0.4 03/28/2025 07:52 AM    ALKPHOS 63 03/28/2025 07:52 AM    AST 34 03/28/2025 07:52 AM    ALT 41 03/28/2025 07:52 AM     Potassium:    Lab Results   Component Value Date/Time    K 3.9 03/28/2025 07:52 AM    K 4.1 06/10/2021 02:09 PM       CT FOOT RIGHT WO CONTRAST   Final Result   1. Findings consistent with osteomyelitis involving the distal aspect of the   2nd digit proximal phalanx with dislocation at the PIP joint.   2. Subacute or chronic fracture and/or osteolysis of the 4th distal phalanx.   3. Subluxation at the 3rd digit PIP joint without definite osteolysis or   periostitis.   4. Edema and/or cellulitis of the forefoot and ankle.   5. Degenerative changes at the tarsometatarsal

## 2025-03-30 ENCOUNTER — APPOINTMENT (OUTPATIENT)
Dept: ULTRASOUND IMAGING | Age: 89
DRG: 616 | End: 2025-03-30
Payer: MEDICARE

## 2025-03-30 ENCOUNTER — APPOINTMENT (OUTPATIENT)
Dept: GENERAL RADIOLOGY | Age: 89
DRG: 616 | End: 2025-03-30
Payer: MEDICARE

## 2025-03-30 ENCOUNTER — APPOINTMENT (OUTPATIENT)
Dept: CT IMAGING | Age: 89
DRG: 616 | End: 2025-03-30
Payer: MEDICARE

## 2025-03-30 LAB
ANION GAP SERPL CALCULATED.3IONS-SCNC: 15 MMOL/L (ref 7–16)
BASOPHILS # BLD: 0.06 K/UL (ref 0–0.2)
BASOPHILS NFR BLD: 1 % (ref 0–2)
BUN SERPL-MCNC: 35 MG/DL (ref 6–23)
CALCIUM SERPL-MCNC: 9.7 MG/DL (ref 8.6–10.2)
CHLORIDE SERPL-SCNC: 95 MMOL/L (ref 98–107)
CO2 SERPL-SCNC: 21 MMOL/L (ref 22–29)
CREAT SERPL-MCNC: 1.5 MG/DL (ref 0.5–1)
CRP SERPL HS-MCNC: 12 MG/L (ref 0–5)
EOSINOPHIL # BLD: 0.18 K/UL (ref 0.05–0.5)
EOSINOPHILS RELATIVE PERCENT: 2 % (ref 0–6)
ERYTHROCYTE [DISTWIDTH] IN BLOOD BY AUTOMATED COUNT: 15 % (ref 11.5–15)
ERYTHROCYTE [SEDIMENTATION RATE] IN BLOOD BY WESTERGREN METHOD: 35 MM/HR (ref 0–20)
GFR, ESTIMATED: 33 ML/MIN/1.73M2
GLUCOSE BLD-MCNC: 154 MG/DL (ref 74–99)
GLUCOSE SERPL-MCNC: 128 MG/DL (ref 74–99)
HCT VFR BLD AUTO: 38.7 % (ref 34–48)
HGB BLD-MCNC: 12.6 G/DL (ref 11.5–15.5)
IMM GRANULOCYTES # BLD AUTO: 0.04 K/UL (ref 0–0.58)
IMM GRANULOCYTES NFR BLD: 0 % (ref 0–5)
LYMPHOCYTES NFR BLD: 2.11 K/UL (ref 1.5–4)
LYMPHOCYTES RELATIVE PERCENT: 19 % (ref 20–42)
MCH RBC QN AUTO: 28.8 PG (ref 26–35)
MCHC RBC AUTO-ENTMCNC: 32.6 G/DL (ref 32–34.5)
MCV RBC AUTO: 88.6 FL (ref 80–99.9)
MICROORGANISM SPEC CULT: ABNORMAL
MICROORGANISM SPEC CULT: NORMAL
MICROORGANISM/AGENT SPEC: ABNORMAL
MONOCYTES NFR BLD: 1.27 K/UL (ref 0.1–0.95)
MONOCYTES NFR BLD: 11 % (ref 2–12)
NEUTROPHILS NFR BLD: 67 % (ref 43–80)
NEUTS SEG NFR BLD: 7.49 K/UL (ref 1.8–7.3)
PLATELET # BLD AUTO: 305 K/UL (ref 130–450)
PMV BLD AUTO: 9.5 FL (ref 7–12)
POTASSIUM SERPL-SCNC: 4.6 MMOL/L (ref 3.5–5)
PROLACTIN SERPL-MCNC: 51.21 NG/ML
RBC # BLD AUTO: 4.37 M/UL (ref 3.5–5.5)
SERVICE CMNT-IMP: ABNORMAL
SERVICE CMNT-IMP: NORMAL
SODIUM SERPL-SCNC: 131 MMOL/L (ref 132–146)
SPECIMEN DESCRIPTION: ABNORMAL
SPECIMEN DESCRIPTION: ABNORMAL
SPECIMEN DESCRIPTION: NORMAL
WBC OTHER # BLD: 11.2 K/UL (ref 4.5–11.5)

## 2025-03-30 PROCEDURE — 85652 RBC SED RATE AUTOMATED: CPT

## 2025-03-30 PROCEDURE — 73610 X-RAY EXAM OF ANKLE: CPT

## 2025-03-30 PROCEDURE — 99291 CRITICAL CARE FIRST HOUR: CPT | Performed by: INTERNAL MEDICINE

## 2025-03-30 PROCEDURE — 2500000003 HC RX 250 WO HCPCS: Performed by: INTERNAL MEDICINE

## 2025-03-30 PROCEDURE — 70450 CT HEAD/BRAIN W/O DYE: CPT

## 2025-03-30 PROCEDURE — 82962 GLUCOSE BLOOD TEST: CPT

## 2025-03-30 PROCEDURE — 86140 C-REACTIVE PROTEIN: CPT

## 2025-03-30 PROCEDURE — 85025 COMPLETE CBC W/AUTO DIFF WBC: CPT

## 2025-03-30 PROCEDURE — 93005 ELECTROCARDIOGRAM TRACING: CPT | Performed by: INTERNAL MEDICINE

## 2025-03-30 PROCEDURE — 36415 COLL VENOUS BLD VENIPUNCTURE: CPT

## 2025-03-30 PROCEDURE — 84146 ASSAY OF PROLACTIN: CPT

## 2025-03-30 PROCEDURE — 2580000003 HC RX 258: Performed by: INTERNAL MEDICINE

## 2025-03-30 PROCEDURE — 93880 EXTRACRANIAL BILAT STUDY: CPT

## 2025-03-30 PROCEDURE — 73630 X-RAY EXAM OF FOOT: CPT

## 2025-03-30 PROCEDURE — 6370000000 HC RX 637 (ALT 250 FOR IP): Performed by: SPECIALIST

## 2025-03-30 PROCEDURE — 6370000000 HC RX 637 (ALT 250 FOR IP): Performed by: INTERNAL MEDICINE

## 2025-03-30 PROCEDURE — 6360000002 HC RX W HCPCS: Performed by: INTERNAL MEDICINE

## 2025-03-30 PROCEDURE — 2060000000 HC ICU INTERMEDIATE R&B

## 2025-03-30 PROCEDURE — 80048 BASIC METABOLIC PNL TOTAL CA: CPT

## 2025-03-30 RX ORDER — ONDANSETRON 2 MG/ML
4 INJECTION INTRAMUSCULAR; INTRAVENOUS EVERY 6 HOURS PRN
Status: DISCONTINUED | OUTPATIENT
Start: 2025-03-30 | End: 2025-04-02 | Stop reason: HOSPADM

## 2025-03-30 RX ORDER — SODIUM CHLORIDE 9 MG/ML
INJECTION, SOLUTION INTRAVENOUS ONCE
Status: COMPLETED | OUTPATIENT
Start: 2025-03-30 | End: 2025-03-30

## 2025-03-30 RX ADMIN — METHYLNALTREXONE BROMIDE 8 MG: 12 INJECTION, SOLUTION SUBCUTANEOUS at 14:13

## 2025-03-30 RX ADMIN — Medication 1 TABLET: at 02:09

## 2025-03-30 RX ADMIN — LINEZOLID 600 MG: 600 TABLET, FILM COATED ORAL at 15:43

## 2025-03-30 RX ADMIN — GABAPENTIN 600 MG: 300 CAPSULE ORAL at 20:14

## 2025-03-30 RX ADMIN — APIXABAN 5 MG: 5 TABLET, FILM COATED ORAL at 20:14

## 2025-03-30 RX ADMIN — SODIUM CHLORIDE: 0.9 INJECTION, SOLUTION INTRAVENOUS at 18:49

## 2025-03-30 RX ADMIN — Medication 1 TABLET: at 20:15

## 2025-03-30 RX ADMIN — LISINOPRIL 40 MG: 20 TABLET ORAL at 15:45

## 2025-03-30 RX ADMIN — LINEZOLID 600 MG: 600 TABLET, FILM COATED ORAL at 20:14

## 2025-03-30 RX ADMIN — METOPROLOL SUCCINATE 25 MG: 25 TABLET, EXTENDED RELEASE ORAL at 15:44

## 2025-03-30 RX ADMIN — AMLODIPINE BESYLATE 5 MG: 5 TABLET ORAL at 15:43

## 2025-03-30 RX ADMIN — CLOPIDOGREL BISULFATE 75 MG: 75 TABLET, FILM COATED ORAL at 15:43

## 2025-03-30 RX ADMIN — CALCIUM ACETATE 667 MG: 667 CAPSULE ORAL at 15:43

## 2025-03-30 RX ADMIN — GABAPENTIN 600 MG: 300 CAPSULE ORAL at 15:44

## 2025-03-30 RX ADMIN — ROSUVASTATIN CALCIUM 20 MG: 20 TABLET, FILM COATED ORAL at 15:45

## 2025-03-30 RX ADMIN — ONDANSETRON 4 MG: 2 INJECTION INTRAMUSCULAR; INTRAVENOUS at 14:12

## 2025-03-30 RX ADMIN — Medication 1 TABLET: at 15:46

## 2025-03-30 RX ADMIN — FUROSEMIDE 20 MG: 20 TABLET ORAL at 15:43

## 2025-03-30 RX ADMIN — CALCIUM ACETATE 667 MG: 667 CAPSULE ORAL at 02:09

## 2025-03-30 RX ADMIN — AMIODARONE HYDROCHLORIDE 100 MG: 200 TABLET ORAL at 15:44

## 2025-03-30 RX ADMIN — PANTOPRAZOLE SODIUM 40 MG: 40 TABLET, DELAYED RELEASE ORAL at 15:44

## 2025-03-30 NOTE — PROGRESS NOTES
NAME: Yola Scott  MR:  [id number]  :   1935  Admit Date:  3/27/2025    Elements of this note, were copied and pasted from Previous. Updates have been made where noted and reflect current exam and medical decision making from the DOS of this encounter.  CHIEF COMPLAINT       Chief Complaint   Patient presents with    Toe Pain     Pt states that she has a hammer tow on her right foot, and Dr. Esposito wanted her to come in      HISTORY OF PRESENT ILLNESS     Yola Scott is a 89 y.o. female admitted on 3/27/2025    Patient Active Problem List   Diagnosis    Coronary artery disease involving native coronary artery of native heart without angina pectoris    Stented coronary artery    Mixed hyperlipidemia    Essential hypertension    COVID-19    Cellulitis    Post-op pain    Diabetic foot ulcer (HCC)    Diabetes mellitus (HCC)    History of heart attack    Hammer toe of second toe of right foot    Long term (current) use of anticoagulants    Pressure ulcer of toe of right foot, stage 3 (HCC)    Hammer toe of right foot       This is a face to face encounter   25 daugter present updated POC  Pt has constipation   Afebrile RA       Assessment & Plan     Admitted for   Hammer toe of right foot [M20.41]  Pressure ulcer of toe of right foot, stage 3 (HCC) [L89.893]  Diabetic ulcer of 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 No reported adverse drug reactions.    Antimicrobials:      linezolid (ZYVOX) tablet 600 mg, 2 times per day     Labs in am   Pt/ot         Monitor for therapeutic and adverse effects to intravenous antibiotics.   Available labs, imaging studies,  HEAVY GROWTH    Susceptibility        Staphylococcus aureus      BACTERIAL SUSCEPTIBILITY PANEL JULIETA      clindamycin 0.25  Resistant      DAPTOmycin 0.25  Sensitive      doxycycline <=0.5  Sensitive      erythromycin >=8  Resistant      gentamicin <=0.5  Sensitive  [1]       Induced Clind Resist POSITIVE  Resistant  [2]       oxacillin 0.5  Sensitive      rifampin <=0.5  Sensitive      tigecycline <=0.12  Sensitive      trimethoprim-sulfamethoxazole <=10  Sensitive      vancomycin <=0.5  Sensitive                   [1]  Gentamicin is used only in combination with other active agents that test susceptible.     [2]  This isolate is presumed to be resistant based on the detection of Inducible Clindamycin   Resistance. Clindamycin may still be effective in some patients.                       Culture with Smear, Acid Fast Bacillius [[id number]] Collected: 03/28/25 1130    Order Status: Canceled Specimen: Tissue     Culture, Wound (with Gram Stain) [[id number]]  (Abnormal) Collected: 03/27/25 2045    Order Status: Completed Specimen: Toe Updated: 03/30/25 0700     Specimen Description .TOE     Direct Exam Swab collections are low-yield and rarely indicated. Generally, the specimen volume when collected by swab is small, reducing the probability of isolating organisms: many organisms adhere to the fibers of the swab, which reduces the opportunity or recovering organisms. Interpret results with caution.         NO Polymorphonuclear leukocytes      NO EPITHELIAL CELLS      NO ORGANISMS SEEN     Culture STAPHYLOCOCCUS AUREUS MODERATE GROWTH For susceptibility, refer to previous culture. SEE SURGICAL CULTURE 3/28/2025 1130 2ND RIGHT TOE      GRAM POSITIVE RODS RESEMBLING DIPHTHEROIDS MODERATE GROWTH            Lab Results   Component Value Date/Time    BC 5 Days no growth 06/10/2021 02:09 PM    BLOODCULT2 5 Days no growth 06/10/2021 02:09 PM    ORG Corynebacterium species 06/19/2019 12:00 PM     No results for input(s):

## 2025-03-30 NOTE — PROGRESS NOTES
Patient became unresponsive in chair. RRT called. Patient did awaken and started speaking to RRT staff. Patient was placed on a telemetry monitor. Orders received.

## 2025-03-30 NOTE — PROGRESS NOTES
Department of Podiatry  Progress Note    SUBJECTIVE:  Patient seen bedside for s/p amputation of second right toe (DOS: 3/28/2025), POD 2. No acute events overnight.  Patient seen with family at bedside.  Patient's daughter requesting patient can be weightbearing to left lower extremity.  Patient denies any N/V/D/F/C/SOB/CP and has no other pedal complaints at this time.     OBJECTIVE:    Scheduled Meds:   linezolid  600 mg Oral 2 times per day    amiodarone  100 mg Oral Daily    amLODIPine  5 mg Oral Daily    apixaban  5 mg Oral BID    calcium acetate  667 mg Oral Q8H    clopidogrel  75 mg Oral Daily    furosemide  20 mg Oral Daily    rosuvastatin  20 mg Oral Daily    therapeutic multivitamin-minerals   Oral Daily    pantoprazole  40 mg Oral Daily    gabapentin  600 mg Oral BID    calcium-cholecalciferol  1 tablet Oral BID WC    lisinopril  40 mg Oral Daily    metoprolol succinate  25 mg Oral Daily     Continuous Infusions:  PRN Meds:.ondansetron, artificial tears, HYDROcodone 5 mg - acetaminophen    Allergies   Allergen Reactions    Ketamine Other (See Comments)     \"weird\" hallucinate    Feldene [Piroxicam] Other (See Comments)     \"weird \"    Oxycontin [Oxycodone Hcl] Other (See Comments)     \"weird \"    Pcn [Penicillins] Rash    Sulfa Antibiotics Other (See Comments)     Unsure of reaction due to long ago       BP (!) 152/76   Pulse 92   Temp 97.5 °F (36.4 °C) (Axillary)   Resp 18   Ht 1.676 m (5' 6\")   Wt 75.8 kg (167 lb)   SpO2 96%   BMI 26.95 kg/m²       EXAM:    CFT less than 3 seconds x 9 digits.  Amputation site and incision site appear to be healing well.  No signs of dehiscence at this time.  Sutures well coapted.    Gross light touch mildly diminished, epicritic sensation mildly diminished    Previous LOWER EXTREMITY EXAMINATION      VASCULAR:  DP faintly palpable, 1/4, PT pulses nonpalpable 0/4. CFT <3 seconds B/L.  Warm to warm from the tibial tuberosity to the distal aspect of the digits  evaluated.  - Reviewed patient's recent lab results and pertinent diagnostic imaging.  - Reviewed ancillary service notes.  - Pain Control: IV and PO  - ABX per ID   - Vascular: Arterial studies ordered and pending  - Cultures : Surgical cultures with moderate gram-positive cocci, few gram-positive cocci in pairs, rare gram-positive cocci in clusters, wound culture = Staph aureus of his growth, gram-positive rods diphtheroids  - X-rays: Right foot x-ray exhibits soft tissue swelling of the second and third digits diffuse soft tissue swelling dorsal aspect of metatarsal and base of digits.  Chronic periostitis of distal tuft of second digit.  Right foot and left ankle x-rays ordered for patient.      - Advanced imaging: Findings consistent with osteomyelitis of the second digit proximal phalanx, edema cellulitis of forefoot ankle degenerative changes of TMT's location and PIPJ of second digit.     - Dressing: Xeroform, DSD applied over incision site.  Daily changes.  Left foot Opticell DSD.  Change today next change for 125  - WB Status: Partial weightbearing with 50% in surgical shoe to heel right foot.  Full weightbearing left foot  - Podiatry Recommendations: Patient underwent second digit amputation and is healing well.  Patient can follow-up outpatient for eventual removal of sutures and wound check.    - Discussed patient with Dr.Ramy Cate DPM FACFAS  Fellowship-Trained Foot and Ankle Surgeon  Diplomate, [employer] Board [employer] Foot [employer] Ankle Sur[employer]  [phone]       - Will continue to follow patient while they are in-house.    Stevo Tan DPM, PGY1

## 2025-03-30 NOTE — PROGRESS NOTES
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 female who was seen and examined today,3/30/2025, at the bedside.  Did complain of postop pain better with medication no fever chills.  Patient complaining of constipation also neck and shoulder pain    family present during my examination.    ROS:  General:   Denies chills, fatigue, fever, malaise, night sweats or weight loss     Psychological:   Denies anxiety, disorientation or hallucinations     ENT:    Denies epistaxis, headaches, vertigo or visual changes     Cardiovascular:   Denies any chest pain, irregular heartbeats, or palpitations. No paroxysmal nocturnal dyspnea.     Respiratory:   Denies shortness of breath, coughing, sputum production, hemoptysis, or wheezing.  No orthopnea.     Gastrointestinal:   Denies nausea, vomiting, diarrhea, or constipation.  Denies any abdominal pain.  Denies change in bowel habits or stools.       Genito-Urinary:    Denies any urgency, frequency, hematuria.  Voiding without difficulty.     Musculoskeletal:   Denies joint pain, joint stiffness, joint swelling or muscle pain; s/p amputation right 2nd toe     Neurology:    Denies any headache or focal neurological deficits. No weakness or paresthesia.     Derm:    Denies any rashes, ulcers, or excoriations.  Denies bruising.      Extremities:   Denies any lower extremity swelling or edema.            Physical Exam:  No intake/output data recorded.  No intake or output data in the 24 hours ending  nd toe S/p Amputation  Pressure ulcer of toe of right foot, stage 3  Diabetic ulcer of toe of right foot associated with diabetes mellitus  Hammer toe right foot 2nd toe  Diabetes mellitus  Hypertension  Hyperlipidemia  Hypothyroid    Plan:     Admit to intermediate floor  Consult podiatry Dr. Esposito  Continue home medications  S/p Amputation 2nd toe right foot  Consult Infectious disease, started on Zyvox 600 mg twice daily for 10 days  Hold anticoagulants and antiplatelet agents will restart the day after surgery  DVT GI prophylaxis  PT OT  Wound care nurse consult  Ultrasound of the carotid bilateral revealed sonographic estimate of less than 50% stenosis proximal R ICA and  L ICA, antegrade flow in the vertebral arteries  Patient follows with Dr. Olivo from cardiology for a stress and echo regularly.  Saw him 2 weeks ago  Continue current therapy.  See orders for further plan of care.  Continue current therapy.  See orders for further plan of care.      More than 50% of my time was spent at the bedside counseling/coordinating care with the patient and/or family with face to face contact.  This time was spent reviewing notes and laboratory data as well as instructing and counseling the patient. Time I spent with the family or surrogate(s) is included only if the patient was incapable of providing the necessary information or participating in medical decisions. I also discussed the differential diagnosis and all of the proposed management plans with the patient and individuals accompanying the patient. I am readily available for any further decision-making and intervention.       Electronically signed by Syed Cunningham MD on 3/30/2025 at 11:09 AM

## 2025-03-30 NOTE — SIGNIFICANT EVENT
Rapid response was called because patient became unresponsive.  Prior to this she was struggling with constipation draining had received several of her medications earlier today including Relistor Lasix Zofran amiodarone Plavix Eliquis Neurontin.    She is being treated for infection recent right second toe amputation on the 27th her glucose was 154 her vital signs are stable O2 sats good by time I came into the room she was fully awake however the report was that she was dizzy then became nonresponsive in a chair she had a left facial droop for less than 1 or 2 minutes she urinated.  By the time I arrived in the room which the nurses were reassuring her and talk to her she was able to give consistent conversation with no pausing at all.  Once we saw that the patient was stable I invited the family members who are visiting back into the room I explained that the patient currently is now in a safe situation I explained some of the testing that we are about to do and that we ultimately would not know right now what the cause of this episode was they offered between 4-6 different possible explanations and I redirected them that we do not know at this moment what the causes but there are many possibilities and I excused myself so I can go check into several things.  I then spoke to Dr. Wilson and planned the rest of the workup which so far included twelve-lead EKG which shows an atrial paced rhythm with some prolonged AV conduction that I would say is mild place her on telemetry.  I will also order a pro lectin CT noncontrast head and a carotid ultrasound.    > 30 minutes spent in critical care time not otherwise billable

## 2025-03-30 NOTE — PROGRESS NOTES
Patient having constipation, nausea and dizziness. Dr. Cunningham notified. Vitals completed. Orders received.

## 2025-03-31 ENCOUNTER — APPOINTMENT (OUTPATIENT)
Dept: INTERVENTIONAL RADIOLOGY/VASCULAR | Age: 89
DRG: 616 | End: 2025-03-31
Payer: MEDICARE

## 2025-03-31 LAB
ALBUMIN SERPL-MCNC: 3.5 G/DL (ref 3.5–5.2)
ALP SERPL-CCNC: 75 U/L (ref 35–104)
ALT SERPL-CCNC: 44 U/L (ref 0–32)
ANION GAP SERPL CALCULATED.3IONS-SCNC: 10 MMOL/L (ref 7–16)
AST SERPL-CCNC: 33 U/L (ref 0–31)
BASOPHILS # BLD: 0.05 K/UL (ref 0–0.2)
BASOPHILS NFR BLD: 1 % (ref 0–2)
BILIRUB SERPL-MCNC: 0.4 MG/DL (ref 0–1.2)
BUN SERPL-MCNC: 29 MG/DL (ref 6–23)
CALCIUM SERPL-MCNC: 9.9 MG/DL (ref 8.6–10.2)
CHLORIDE SERPL-SCNC: 100 MMOL/L (ref 98–107)
CO2 SERPL-SCNC: 27 MMOL/L (ref 22–29)
CREAT SERPL-MCNC: 1.2 MG/DL (ref 0.5–1)
EKG ATRIAL RATE: 62 BPM
EKG P-R INTERVAL: 242 MS
EKG Q-T INTERVAL: 466 MS
EKG QRS DURATION: 92 MS
EKG QTC CALCULATION (BAZETT): 472 MS
EKG R AXIS: 20 DEGREES
EKG T AXIS: 13 DEGREES
EKG VENTRICULAR RATE: 62 BPM
EOSINOPHIL # BLD: 0.14 K/UL (ref 0.05–0.5)
EOSINOPHILS RELATIVE PERCENT: 2 % (ref 0–6)
ERYTHROCYTE [DISTWIDTH] IN BLOOD BY AUTOMATED COUNT: 15.1 % (ref 11.5–15)
GFR, ESTIMATED: 42 ML/MIN/1.73M2
GLUCOSE SERPL-MCNC: 98 MG/DL (ref 74–99)
HCT VFR BLD AUTO: 39.4 % (ref 34–48)
HGB BLD-MCNC: 12.6 G/DL (ref 11.5–15.5)
IMM GRANULOCYTES # BLD AUTO: <0.03 K/UL (ref 0–0.58)
IMM GRANULOCYTES NFR BLD: 0 % (ref 0–5)
LYMPHOCYTES NFR BLD: 1.33 K/UL (ref 1.5–4)
LYMPHOCYTES RELATIVE PERCENT: 18 % (ref 20–42)
MCH RBC QN AUTO: 28.7 PG (ref 26–35)
MCHC RBC AUTO-ENTMCNC: 32 G/DL (ref 32–34.5)
MCV RBC AUTO: 89.7 FL (ref 80–99.9)
MONOCYTES NFR BLD: 0.76 K/UL (ref 0.1–0.95)
MONOCYTES NFR BLD: 11 % (ref 2–12)
NEUTROPHILS NFR BLD: 69 % (ref 43–80)
NEUTS SEG NFR BLD: 4.98 K/UL (ref 1.8–7.3)
PLATELET # BLD AUTO: 278 K/UL (ref 130–450)
PMV BLD AUTO: 9.2 FL (ref 7–12)
POTASSIUM SERPL-SCNC: 4.1 MMOL/L (ref 3.5–5)
PROT SERPL-MCNC: 7.2 G/DL (ref 6.4–8.3)
RBC # BLD AUTO: 4.39 M/UL (ref 3.5–5.5)
SODIUM SERPL-SCNC: 137 MMOL/L (ref 132–146)
WBC OTHER # BLD: 7.3 K/UL (ref 4.5–11.5)

## 2025-03-31 PROCEDURE — 97165 OT EVAL LOW COMPLEX 30 MIN: CPT

## 2025-03-31 PROCEDURE — 97161 PT EVAL LOW COMPLEX 20 MIN: CPT | Performed by: PHYSICAL THERAPIST

## 2025-03-31 PROCEDURE — 2500000003 HC RX 250 WO HCPCS: Performed by: INTERNAL MEDICINE

## 2025-03-31 PROCEDURE — 97530 THERAPEUTIC ACTIVITIES: CPT | Performed by: PHYSICAL THERAPIST

## 2025-03-31 PROCEDURE — 80053 COMPREHEN METABOLIC PANEL: CPT

## 2025-03-31 PROCEDURE — 93923 UPR/LXTR ART STDY 3+ LVLS: CPT

## 2025-03-31 PROCEDURE — 6370000000 HC RX 637 (ALT 250 FOR IP): Performed by: INTERNAL MEDICINE

## 2025-03-31 PROCEDURE — 97110 THERAPEUTIC EXERCISES: CPT | Performed by: PHYSICAL THERAPIST

## 2025-03-31 PROCEDURE — 6370000000 HC RX 637 (ALT 250 FOR IP): Performed by: SPECIALIST

## 2025-03-31 PROCEDURE — 36415 COLL VENOUS BLD VENIPUNCTURE: CPT

## 2025-03-31 PROCEDURE — 97535 SELF CARE MNGMENT TRAINING: CPT

## 2025-03-31 PROCEDURE — 93010 ELECTROCARDIOGRAM REPORT: CPT | Performed by: INTERNAL MEDICINE

## 2025-03-31 PROCEDURE — 2580000003 HC RX 258: Performed by: INTERNAL MEDICINE

## 2025-03-31 PROCEDURE — 85025 COMPLETE CBC W/AUTO DIFF WBC: CPT

## 2025-03-31 PROCEDURE — 2060000000 HC ICU INTERMEDIATE R&B

## 2025-03-31 RX ORDER — SODIUM CHLORIDE 9 MG/ML
INJECTION, SOLUTION INTRAVENOUS CONTINUOUS
Status: DISCONTINUED | OUTPATIENT
Start: 2025-03-31 | End: 2025-04-02 | Stop reason: HOSPADM

## 2025-03-31 RX ADMIN — APIXABAN 5 MG: 5 TABLET, FILM COATED ORAL at 08:14

## 2025-03-31 RX ADMIN — CEPHALEXIN 500 MG: 250 CAPSULE ORAL at 17:36

## 2025-03-31 RX ADMIN — APIXABAN 5 MG: 5 TABLET, FILM COATED ORAL at 20:16

## 2025-03-31 RX ADMIN — CLOPIDOGREL BISULFATE 75 MG: 75 TABLET, FILM COATED ORAL at 08:13

## 2025-03-31 RX ADMIN — AMIODARONE HYDROCHLORIDE 100 MG: 200 TABLET ORAL at 08:14

## 2025-03-31 RX ADMIN — SODIUM CHLORIDE: 0.9 INJECTION, SOLUTION INTRAVENOUS at 12:20

## 2025-03-31 RX ADMIN — CALCIUM ACETATE 667 MG: 667 CAPSULE ORAL at 06:12

## 2025-03-31 RX ADMIN — GABAPENTIN 600 MG: 300 CAPSULE ORAL at 08:14

## 2025-03-31 RX ADMIN — ROSUVASTATIN CALCIUM 20 MG: 20 TABLET, FILM COATED ORAL at 08:14

## 2025-03-31 RX ADMIN — GABAPENTIN 600 MG: 300 CAPSULE ORAL at 20:16

## 2025-03-31 RX ADMIN — METOPROLOL SUCCINATE 25 MG: 25 TABLET, EXTENDED RELEASE ORAL at 08:14

## 2025-03-31 RX ADMIN — Medication 1 TABLET: at 20:16

## 2025-03-31 RX ADMIN — LISINOPRIL 40 MG: 20 TABLET ORAL at 08:13

## 2025-03-31 RX ADMIN — AMLODIPINE BESYLATE 5 MG: 5 TABLET ORAL at 08:14

## 2025-03-31 RX ADMIN — FUROSEMIDE 20 MG: 20 TABLET ORAL at 08:13

## 2025-03-31 RX ADMIN — Medication 1 TABLET: at 17:36

## 2025-03-31 RX ADMIN — CALCIUM ACETATE 667 MG: 667 CAPSULE ORAL at 12:22

## 2025-03-31 RX ADMIN — PANTOPRAZOLE SODIUM 40 MG: 40 TABLET, DELAYED RELEASE ORAL at 08:14

## 2025-03-31 RX ADMIN — LINEZOLID 600 MG: 600 TABLET, FILM COATED ORAL at 08:13

## 2025-03-31 RX ADMIN — CEPHALEXIN 500 MG: 250 CAPSULE ORAL at 20:16

## 2025-03-31 RX ADMIN — CALCIUM ACETATE 667 MG: 667 CAPSULE ORAL at 17:37

## 2025-03-31 ASSESSMENT — PAIN SCALES - GENERAL: PAINLEVEL_OUTOF10: 0

## 2025-03-31 NOTE — PLAN OF CARE
Problem: Chronic Conditions and Co-morbidities  Goal: Patient's chronic conditions and co-morbidity symptoms are monitored and maintained or improved  Outcome: Progressing  Flowsheets (Taken 3/31/2025 0800)  Care Plan - Patient's Chronic Conditions and Co-Morbidity Symptoms are Monitored and Maintained or Improved:   Monitor and assess patient's chronic conditions and comorbid symptoms for stability, deterioration, or improvement   Collaborate with multidisciplinary team to address chronic and comorbid conditions and prevent exacerbation or deterioration     Problem: Discharge Planning  Goal: Discharge to home or other facility with appropriate resources  Outcome: Progressing  Flowsheets (Taken 3/31/2025 0800)  Discharge to home or other facility with appropriate resources:   Identify barriers to discharge with patient and caregiver   Arrange for needed discharge resources and transportation as appropriate   Identify discharge learning needs (meds, wound care, etc)     Problem: Safety - Adult  Goal: Free from fall injury  Outcome: Progressing  Flowsheets (Taken 3/31/2025 0924)  Free From Fall Injury:   Instruct family/caregiver on patient safety   Based on caregiver fall risk screen, instruct family/caregiver to ask for assistance with transferring infant if caregiver noted to have fall risk factors

## 2025-03-31 NOTE — CARE COORDINATION
3/31/25 Pt admitted for hammer toe of right foot & had amputee of rt 2nd toe on 3/28.  Receiving a vascular lower extremity arterial segmental pressure w/ PPG today. Ordered IV fluids. Room air. [facility] order completed & indicated [facility] as provider, but pt is currently active with [facility] for nursing & order will need updated prior to discharge. ONCE WOUND CARE/NURSING NEEDS DETERMINED; [facility] ORDER NEEDS COMPLETED FOR [facility] WITH NEW WOUND CARE ORDERS/INSTRUCTIONS. Pt baseline is ambulating with 4 prong cane as needed & initially declined PT/OT, but ordered today & evals completed with recommendation for [facility] placement upon discharge. Attempted to follow up with pt at bedside, but she was out of room for scheduled procedure. Pts current plan is to return home independently with family support as needed. Dr. Sloan is pcp & Dr. Esposito is podiatrist. [facility] is mail order pharmacy & [facility] on [address] pharmacy for immediate needs. Family to provide transport upon d/c. SW will follow up with pt tomorrow. Electronically signed by GLORIA Hawkins on 3/31/2025 at 3:23 PM

## 2025-03-31 NOTE — PROGRESS NOTES
Dr. Cunningham notified of labs. Updated on patient condition. Orders received for bolus and enema.

## 2025-03-31 NOTE — PROGRESS NOTES
Department of Podiatry  Progress Note        SUBJECTIVE: Patient seen today for follow up S/p amputation of second right toe (DOS: 3/28/2025). No acute events overnight. Patient's dressings changed today. Patient denies any N/V/D/F/C/SOB/CP and has no other pedal complaints at this time.       Scheduled Meds:   linezolid  600 mg Oral 2 times per day    amiodarone  100 mg Oral Daily    amLODIPine  5 mg Oral Daily    apixaban  5 mg Oral BID    calcium acetate  667 mg Oral Q8H    clopidogrel  75 mg Oral Daily    furosemide  20 mg Oral Daily    rosuvastatin  20 mg Oral Daily    therapeutic multivitamin-minerals   Oral Daily    pantoprazole  40 mg Oral Daily    gabapentin  600 mg Oral BID    calcium-cholecalciferol  1 tablet Oral BID WC    lisinopril  40 mg Oral Daily    metoprolol succinate  25 mg Oral Daily     Continuous Infusions:   sodium chloride       PRN Meds:.ondansetron, artificial tears, HYDROcodone 5 mg - acetaminophen    Allergies   Allergen Reactions    Ketamine Other (See Comments)     \"weird\" hallucinate    Feldene [Piroxicam] Other (See Comments)     \"weird \"    Oxycontin [Oxycodone Hcl] Other (See Comments)     \"weird \"    Pcn [Penicillins] Rash    Sulfa Antibiotics Other (See Comments)     Unsure of reaction due to long ago       BP (!) 152/62   Pulse 63   Temp 97.5 °F (36.4 °C) (Oral)   Resp 18   Ht 1.676 m (5' 6\")   Wt 75.8 kg (167 lb)   SpO2 100%   BMI 26.95 kg/m²         LOWER EXTREMITY EXAMINATION      VASCULAR:  DP and PT are faintly palpable CFT <3 seconds B/L.  Warm to warm from the tibial tuberosity to the distal aspect of the digits dorsally. Hair growth absent to the distal aspects dorsally.       NEUROLOGIC:  Gross light touch sensation intact but diminished, epicritic sensation diminished nearly absent to pedal extremities     DERM:  Surgical site noted to the right foot at the level of right 2nd digit amputation site. Sutures appear intact. Skin is well approximated. No erythema,  Result   1. No acute fracture or dislocation.   2. Significant degenerative changes in the midfoot region.   3. Status post amputation of the phalanges of the 2nd digit.  There is a soft   tissue defect in the region of the amputation   4. Hammertoe deformities of the 3rd through 5th digits.   5. No evidence of osteomyelitis.         CT FOOT RIGHT WO CONTRAST   Final Result   1. Findings consistent with osteomyelitis involving the distal aspect of the   2nd digit proximal phalanx with dislocation at the PIP joint.   2. Subacute or chronic fracture and/or osteolysis of the 4th distal phalanx.   3. Subluxation at the 3rd digit PIP joint without definite osteolysis or   periostitis.   4. Edema and/or cellulitis of the forefoot and ankle.   5. Degenerative changes at the tarsometatarsal and intertarsal joints of the   right midfoot.         XR FOOT RIGHT (MIN 3 VIEWS)   Final Result   1.  Severe however toe like deformity for the 2nd toe, 3rd toe and in lesser   degree for the 4th and 5th toes.      2.  More dominant soft tissue swelling is seen in the 2nd and in the 3rd   also, in addition to the diffuse soft tissue swelling in the dorsal aspect of   the metatarsal region extending to the base of toes.      3.  Also soft tissue swelling is seen along the medial aspect of the base of   the great toe to be correlate clinically.      4.  Degenerative changes are seen predominant in the tarsal-metatarsal joints   at multiple levels.         Vascular lower extremity arterial segmental pressures w PPG    (Results Pending)     BP (!) 152/62   Pulse 63   Temp 97.5 °F (36.4 °C) (Oral)   Resp 18   Ht 1.676 m (5' 6\")   Wt 75.8 kg (167 lb)   SpO2 100%   BMI 26.95 kg/m²     LABS:    Recent Labs     03/30/25  1644 03/31/25  0720   WBC 11.2 7.3   HGB 12.6 12.6   HCT 38.7 39.4    278        Recent Labs     03/31/25  0720      K 4.1      CO2 27   BUN 29*   CREATININE 1.2*      No results for input(s): \"INR\",

## 2025-03-31 NOTE — PROGRESS NOTES
grid    Patient and or family understand(s) diagnosis, prognosis, and plan of care.    Frequency of treatments: Patient will be seen  dailyyy         Prior Level of Function: Patient ambulated with quad cane   Rehab Potential: fair + for baseline    Past medical history:   Past Medical History:   Diagnosis Date    Asthma     CAD (coronary artery disease)     Cancer (HCC)     melanoma  legs    Diabetes mellitus (HCC)     Gastroesophageal reflux disease without esophagitis     H/O cardiovascular stress test 02/14/2020    Heart attack (HCC) 01/2013    Hyperlipidemia     Hypertension     Neuropathy     Wears hearing aid      Past Surgical History:   Procedure Laterality Date    BACK SURGERY  2007    lumbar    BLADDER SUSPENSION  2002    COLONOSCOPY      CORONARY ANGIOPLASTY WITH STENT PLACEMENT  01/2013    3.0 x 20 mm in LAD   x1 stent    DIAGNOSTIC CARDIAC CATH LAB PROCEDURE      EYE SURGERY      cataract ghada eyes    HYSTERECTOMY (CERVIX STATUS UNKNOWN)  1983    JOINT REPLACEMENT Bilateral     knees    KNEE SURGERY Bilateral     LEG BIOPSY EXCISION Left 03/16/2021    EXCISION LEFT LATERAL LEG SQUAMOUS CELL CARCINOMA, FULL THICKNESS SKIN GRAFT --EXCISIONAL BIOPSY WITH COMPLEX CLOSURE performed [place] [facility] [place]    LEG BIOPSY EXCISION Right 09/02/2021    EXCISION SQUAMOUS CELL CARCINOMA RIGHT ANTERIOR LEG, FROZEN, performed [place] [facility] [place]    MALIGNANT SKIN LESION EXCISION      Dr. Woody and Dr. Cardozo (twice)   melanoma    NECK SURGERY Right 8/15/2022    EXCISION SQUAMOUS CELL CARCINOMA Right LOWER LEG WITH FROZEN SECTIONT (CPT 52922, 34791) performed by Itz Cardozo MD at [facility] OR    TOE AMPUTATION Right 3/28/2025    SECOND DIGIT  AMPUTATION RIGHT FOOT performed by Tano Esposito DPM at [facility] OR    TOTAL HIP ARTHROPLASTY Bilateral     left 1999 & revision 2007 & right 2001       SUBJECTIVE:    Precautions: Up with assistance, falls and 2nd digit amputation PWB (50%)  independence and quality of life.         Patient's/ family goals   rehab    Time in    1057  Time out  1140    Total Treatment Time  23 minutes    Evaluation time includes thorough review of current medical information, gathering information on past medical history/social history and prior level of function, completion of standardized testing/informal observation of tasks, assessment of data, and development of Plan of care and goals.     CPT codes:  Low Complexity PT evaluation (21959)  Therapeutic activities (22725)   15 minutes  1 unit(s)  Therapeutic exercises (93420)   8 minutes  1 unit(s)    Robert Mendiola, PT

## 2025-03-31 NOTE — PROGRESS NOTES
Moderate Assist  Supervision    Toileting Maximal Assist to complete buttock and asif hygiene.   Minimal Assist    Bed Mobility  Supine to sit: N/T   Sit to supine: N/T   Rolling:N/T    Supine to sit: Minimal Assist   Sit to supine: Minimal Assist   Rolling:Minimal Assist     Functional Transfers Moderate Assist from bedside chair to wheeled walker.  Moderate assist of 2 progressing to maximal assist of 2, pt demonstrated dizziness and start of syncope, maximal assist of 2 to return to chair. Nurse present in room.   Transfer training with verbal cues for hand placement throughout session to improve safety.   Supervision    Functional Mobility Not assessed  d/t pt overall debility, decreased activity tolerance, balance deficits, safety and fall risk.  Minimal Assist    Balance Sitting:     Static: fair     Dynamic: fair   Standing: poor with wheeled walker  Sitting:     Static: good     Dynamic: good   Standing: fair  with wheeled walker   Activity Tolerance poor  fair    Visual/  Perceptual Glasses: yes, macular degeneration                Hand Dominance: Right     AROM (PROM) Strength Additional Info:  Goal:   RUE  60 degrees 3-/5 good  and wfl FMC/dexterity noted during ADL tasks   Improve overall RUE strength  for participation in functional tasks   LUE WFL 4/5 good  and wfl FMC/dexterity noted during ADL tasks   Improve overall LUE strength  for participation in functional tasks      Vitals:  HR at rest: 62 bpm HR with activity: 59 bpm   SpO2 at rest: 94% SpO2 with activity: 99%   BP at rest: 88/53 BP with activity: 73/48     Hearing: WFL   Sensation:  No c/o numbness or tingling  Tone: WFL   Edema: BLE's    Comments: Upon arrival the patient was seated in bedside chair.  At end of session, patient was seated in bedside chair with nurse in room with call light and phone within reach, all lines and tubes intact.  Overall patient demonstrated decreased independence and safety during completion of  ADL/functional transfer/mobility tasks.  Pt would benefit from continued skilled OT to increase safety and independence with completion of ADL/IADL tasks for functional independence and quality of life.    Treatment: OT treatment provided this date includes:   Instruction/training on safety and adapted techniques for completion of ADLs   Instruction/training on safe functional mobility/transfer techniques   Instruction/training on energy conservation/work simplification for completion of ADLs   Instruction/training on proper positioning/alignment to prevent contractures    Neuromuscular Reeducation to facilitate balance/righting reactions for increased function with ADLs tasks   Facilitation of Visual Perceptual Skills for increased safety and independence with ADLs    Rehab Potential: Good for established goals.      Patient / Family Goal: rehab      Patient and/or family were instructed on functional diagnosis, prognosis/goals and OT plan of care. Demonstrated good understanding.     Eval Complexity: Low    Time In: 11:50 am  Time Out: 12:17 pm   Total Treatment Time: 12      Min Units   OT Eval Low 97165  X  1    OT Eval Medium 91875      OT Eval High 30863      OT Re-Eval 11485            ADL/Self Care 82916 12 1   Therapeutic Activities 84346       Therapeutic Ex 27901       Orthotic Management 41279       Manual 07919     Neuro Re-Ed 65273       Non-Billable Time        Evaluation Time additionally includes thorough review of current medical information, gathering information on past medical history/social history and prior level of function, interpretation of standardized testing/informal observation of tasks, assessment of data and development of plan of care and goals.        Evaluating OT: Emily Mcdonough OTR/L [id number]

## 2025-03-31 NOTE — PROGRESS NOTES
trimethoprim-sulfamethoxazole <=10  Sensitive      vancomycin <=0.5  Sensitive                   [1]  Gentamicin is used only in combination with other active agents that test susceptible.     [2]  This isolate is presumed to be resistant based on the detection of Inducible Clindamycin   Resistance. Clindamycin may still be effective in some patients.                       Culture with Smear, Acid Fast Bacillius [[id number]] Collected: 03/28/25 1130    Order Status: Canceled Specimen: Tissue     Culture, Wound (with Gram Stain) [[id number]]  (Abnormal) Collected: 03/27/25 2045    Order Status: Completed Specimen: Toe Updated: 03/30/25 0700     Specimen Description .TOE     Direct Exam Swab collections are low-yield and rarely indicated. Generally, the specimen volume when collected by swab is small, reducing the probability of isolating organisms: many organisms adhere to the fibers of the swab, which reduces the opportunity or recovering organisms. Interpret results with caution.         NO Polymorphonuclear leukocytes      NO EPITHELIAL CELLS      NO ORGANISMS SEEN     Culture STAPHYLOCOCCUS AUREUS MODERATE GROWTH For susceptibility, refer to previous culture. SEE SURGICAL CULTURE 3/28/2025 1130 2ND RIGHT TOE      GRAM POSITIVE RODS RESEMBLING DIPHTHEROIDS MODERATE GROWTH            Lab Results   Component Value Date/Time    BC 5 Days no growth 06/10/2021 02:09 PM    BLOODCULT2 5 Days no growth 06/10/2021 02:09 PM    ORG Corynebacterium species 06/19/2019 12:00 PM     No results for input(s): \"CDIFFTOXANT\" in the last 72 hours.  WOUND/ABSCESS   Date Value Ref Range Status   06/29/2023   Final    Mixed lester isolated. Further workup and sensitivity testing  is not routinely indicated and will not be performed.  Mixed lester isolated includes:  Coagulase negative Staph species  Corynebacteria  Yeast     06/19/2019 Moderate growth  Mixed morphologies    Final     Recent Labs     03/30/25  1644 03/31/25  0720   WBC  questions or concerns.    Electronically signed by Loan Noyola MD on 3/31/2025 at 9:23 AM

## 2025-03-31 NOTE — PROGRESS NOTES
Primary Care Physician: Alfred Lopez MD   Admitting Physician:  Syed Cunningham MD  Admission date and time: 3/27/2025  7:37 PM    Room:  [address] [place] [address]  Admitting diagnosis: Hammer toe of right foot [M20.41]  Pressure ulcer of toe of right foot, stage 3 (HCC) [L89.893]  Diabetic ulcer of toe of right foot associated with diabetes mellitus due to underlying condition, with fat layer exposed [E08.621, L97.512]    Patient Name: Yola Scott  MRN: [id number]    Date of Service: 3/31/2025     Subjective:  Yola Guzman is a 89 y.o. female who was seen and examined today,3/31/2025, at the bedside.  Did complain of postop pain better with medication no fever chills.  Patient complaining of constipation also neck and shoulder pain.  Events of last evening were noted a rapid response was called because patient had 2 episodes of syncope.  I also discussed the case with the hospitalist.  Labs were performed which showed elevation in the creatinine from the description of the syncope and the first time he lived most likely vasovagal.  CAT scan of the head was ordered which was completely unremarkable, because the patient had urinated during this procedure the hospitalist was concerned about cerebral event April prolactin was done which was elevated at 51.21 rest of the labs were essentially unremarkable except for elevated creatinine.  We will get neuro to see the patient.  Bilateral carotid shows 50 to 70% blockage and a CTA of the carotids will be ordered.    family present during my examination.    ROS:  General:   Denies chills, fatigue, fever, malaise, night sweats or weight loss     Psychological:   Denies anxiety, disorientation or hallucinations     ENT:    Denies epistaxis, headaches, vertigo or visual changes     Cardiovascular:   Denies any chest pain, irregular heartbeats, or palpitations. No paroxysmal nocturnal dyspnea.     Respiratory:   Denies shortness of breath, coughing, sputum production,  further decision-making and intervention.       Electronically signed by Syed Cunningham MD on 3/31/2025 at 6:56 AM

## 2025-04-01 PROCEDURE — 2500000003 HC RX 250 WO HCPCS: Performed by: INTERNAL MEDICINE

## 2025-04-01 PROCEDURE — 6370000000 HC RX 637 (ALT 250 FOR IP): Performed by: SPECIALIST

## 2025-04-01 PROCEDURE — 2580000003 HC RX 258: Performed by: INTERNAL MEDICINE

## 2025-04-01 PROCEDURE — 2060000000 HC ICU INTERMEDIATE R&B

## 2025-04-01 PROCEDURE — 6370000000 HC RX 637 (ALT 250 FOR IP): Performed by: INTERNAL MEDICINE

## 2025-04-01 RX ORDER — CEPHALEXIN 500 MG/1
500 CAPSULE ORAL EVERY 8 HOURS SCHEDULED
Qty: 21 CAPSULE | Refills: 0 | Status: SHIPPED | OUTPATIENT
Start: 2025-04-01 | End: 2025-04-08

## 2025-04-01 RX ORDER — GABAPENTIN 300 MG/1
300 CAPSULE ORAL 3 TIMES DAILY
Qty: 90 CAPSULE | Refills: 0 | Status: SHIPPED | OUTPATIENT
Start: 2025-04-01 | End: 2025-05-01

## 2025-04-01 RX ORDER — GABAPENTIN 300 MG/1
300 CAPSULE ORAL 3 TIMES DAILY
Status: DISCONTINUED | OUTPATIENT
Start: 2025-04-01 | End: 2025-04-02 | Stop reason: HOSPADM

## 2025-04-01 RX ADMIN — AMLODIPINE BESYLATE 5 MG: 5 TABLET ORAL at 08:09

## 2025-04-01 RX ADMIN — Medication 1 TABLET: at 17:56

## 2025-04-01 RX ADMIN — CALCIUM ACETATE 667 MG: 667 CAPSULE ORAL at 17:56

## 2025-04-01 RX ADMIN — CALCIUM ACETATE 667 MG: 667 CAPSULE ORAL at 12:03

## 2025-04-01 RX ADMIN — CEPHALEXIN 500 MG: 250 CAPSULE ORAL at 05:07

## 2025-04-01 RX ADMIN — GABAPENTIN 600 MG: 300 CAPSULE ORAL at 08:09

## 2025-04-01 RX ADMIN — PANTOPRAZOLE SODIUM 40 MG: 40 TABLET, DELAYED RELEASE ORAL at 08:10

## 2025-04-01 RX ADMIN — GABAPENTIN 300 MG: 300 CAPSULE ORAL at 15:16

## 2025-04-01 RX ADMIN — CEPHALEXIN 500 MG: 250 CAPSULE ORAL at 21:59

## 2025-04-01 RX ADMIN — APIXABAN 5 MG: 5 TABLET, FILM COATED ORAL at 21:59

## 2025-04-01 RX ADMIN — CALCIUM ACETATE 667 MG: 667 CAPSULE ORAL at 00:59

## 2025-04-01 RX ADMIN — APIXABAN 5 MG: 5 TABLET, FILM COATED ORAL at 08:09

## 2025-04-01 RX ADMIN — Medication 1 TABLET: at 21:59

## 2025-04-01 RX ADMIN — METOPROLOL SUCCINATE 25 MG: 25 TABLET, EXTENDED RELEASE ORAL at 08:10

## 2025-04-01 RX ADMIN — CLOPIDOGREL BISULFATE 75 MG: 75 TABLET, FILM COATED ORAL at 08:09

## 2025-04-01 RX ADMIN — CEPHALEXIN 500 MG: 250 CAPSULE ORAL at 15:16

## 2025-04-01 RX ADMIN — FUROSEMIDE 20 MG: 20 TABLET ORAL at 08:09

## 2025-04-01 RX ADMIN — LISINOPRIL 40 MG: 20 TABLET ORAL at 21:59

## 2025-04-01 RX ADMIN — SODIUM CHLORIDE: 0.9 INJECTION, SOLUTION INTRAVENOUS at 01:01

## 2025-04-01 RX ADMIN — ROSUVASTATIN CALCIUM 20 MG: 20 TABLET, FILM COATED ORAL at 08:09

## 2025-04-01 RX ADMIN — GABAPENTIN 300 MG: 300 CAPSULE ORAL at 21:59

## 2025-04-01 RX ADMIN — AMIODARONE HYDROCHLORIDE 100 MG: 200 TABLET ORAL at 08:09

## 2025-04-01 ASSESSMENT — PAIN SCALES - GENERAL: PAINLEVEL_OUTOF10: 0

## 2025-04-01 NOTE — PROGRESS NOTES
NAME: Yola Scott  MR:  [id number]  :   1935  Admit Date:  3/27/2025    Elements of this note, were copied and pasted from Previous. Updates have been made where noted and reflect current exam and medical decision making from the DOS of this encounter.  CHIEF COMPLAINT       Chief Complaint   Patient presents with    Toe Pain     Pt states that she has a hammer tow on her right foot, and Dr. Esposito wanted her to come in      HISTORY OF PRESENT ILLNESS     Yola Scott is a 89 y.o. female admitted on 3/27/2025    Patient Active Problem List   Diagnosis    Coronary artery disease involving native coronary artery of native heart without angina pectoris    Stented coronary artery    Mixed hyperlipidemia    Essential hypertension    COVID-19    Cellulitis    Post-op pain    Diabetic foot ulcer (HCC)    Diabetes mellitus (HCC)    History of heart attack    Hammer toe of second toe of right foot    Long term (current) use of anticoagulants    Pressure ulcer of toe of right foot, stage 3 (HCC)    Hammer toe of right foot       This is a face to face encounter   25  in chair [place] no f/c/n/v/d foot dressed  3/31 s/p RRT on 3/31 -afebrile wbc7.3 cr1.2 no family present   3/30 daugter present updated POC  Pt has constipation   Afebrile RA       Assessment & Plan     Admitted for   Hammer toe of right foot [M20.41]  Pressure ulcer of toe of right foot, stage 3 (HCC) [L89.893]  Diabetic ulcer of toe of right foot associated with diabetes mellitus due to underlying condition, with fat layer exposed [E08.621, L97.512]    ID following for   Acute osteomyelitis of toe of right foot (HCC) [M86.171]  3/28 Procedure(s): SECOND DIGIT  AMPUTATION RIGHT FOOT  Left plantar ulcer POA   Allergic pcn sulfa   Has had cephalexin before      Check esr/crp   Atbx few days to cover cellulitis  Patient is tolerating medications. No reported adverse drug reactions.    Antimicrobials:       cephalexin  med rec    questions or concerns.    Electronically signed by Loan Noyola MD on 4/1/2025 at 10:27 AM

## 2025-04-01 NOTE — PROGRESS NOTES
Department of Podiatry  Progress Note        SUBJECTIVE: Patient seen today for follow up S/p amputation of second right toe (DOS: 3/28/2025). No acute events overnight. Patient's dressings appear clean dry and intact. Patient denies any N/V/D/F/C/SOB/CP and has no other pedal complaints at this time.       Scheduled Meds:   cephALEXin  500 mg Oral 3 times per day    amiodarone  100 mg Oral Daily    amLODIPine  5 mg Oral Daily    apixaban  5 mg Oral BID    calcium acetate  667 mg Oral Q8H    clopidogrel  75 mg Oral Daily    furosemide  20 mg Oral Daily    rosuvastatin  20 mg Oral Daily    therapeutic multivitamin-minerals   Oral Daily    pantoprazole  40 mg Oral Daily    gabapentin  600 mg Oral BID    calcium-cholecalciferol  1 tablet Oral BID WC    lisinopril  40 mg Oral Daily    metoprolol succinate  25 mg Oral Daily     Continuous Infusions:   sodium chloride Stopped (04/01/25 0746)     PRN Meds:.ondansetron, artificial tears, HYDROcodone 5 mg - acetaminophen    Allergies   Allergen Reactions    Ketamine Other (See Comments)     \"weird\" hallucinate    Feldene [Piroxicam] Other (See Comments)     \"weird \"    Oxycontin [Oxycodone Hcl] Other (See Comments)     \"weird \"    Pcn [Penicillins] Rash    Sulfa Antibiotics Other (See Comments)     Unsure of reaction due to long ago       BP (!) 169/71   Pulse 66   Temp 97.9 °F (36.6 °C) (Oral)   Resp 18   Ht 1.676 m (5' 6\")   Wt 75.8 kg (167 lb)   SpO2 96%   BMI 26.95 kg/m²         LOWER EXTREMITY EXAMINATION      VASCULAR:  DP and PT are faintly palpable CFT <3 seconds B/L.  Warm to warm from the tibial tuberosity to the distal aspect of the digits dorsally. Hair growth absent to the distal aspects dorsally.       NEUROLOGIC:  Gross light touch sensation intact but diminished, epicritic sensation diminished nearly absent to pedal extremities     DERM:  Surgical site noted to the right foot at the level of right 2nd digit amputation site. Sutures appear intact. Skin

## 2025-04-01 NOTE — PLAN OF CARE
Problem: Chronic Conditions and Co-morbidities  Goal: Patient's chronic conditions and co-morbidity symptoms are monitored and maintained or improved  4/1/2025 0939 by Gaston Andrews RN  Outcome: Progressing  Flowsheets (Taken 4/1/2025 0809)  Care Plan - Patient's Chronic Conditions and Co-Morbidity Symptoms are Monitored and Maintained or Improved:   Monitor and assess patient's chronic conditions and comorbid symptoms for stability, deterioration, or improvement   Collaborate with multidisciplinary team to address chronic and comorbid conditions and prevent exacerbation or deterioration  4/1/2025 0633 by Katelin Soto RN  Outcome: Progressing     Problem: Discharge Planning  Goal: Discharge to home or other facility with appropriate resources  4/1/2025 0939 by Gaston Andrews RN  Outcome: Progressing  Flowsheets (Taken 4/1/2025 0809)  Discharge to home or other facility with appropriate resources:   Identify barriers to discharge with patient and caregiver   Arrange for needed discharge resources and transportation as appropriate   Identify discharge learning needs (meds, wound care, etc)  4/1/2025 0633 by Katelin Soto RN  Outcome: Progressing     Problem: ABCDS Injury Assessment  Goal: Absence of physical injury  4/1/2025 0939 by Gaston Andrews RN  Outcome: Progressing  Flowsheets (Taken 4/1/2025 0938)  Absence of Physical Injury: Implement safety measures based on patient assessment  4/1/2025 0633 by Katelin Soto RN  Outcome: Progressing     Problem: Safety - Adult  Goal: Free from fall injury  4/1/2025 0939 by Gaston Andrews RN  Outcome: Progressing  Flowsheets (Taken 4/1/2025 0938)  Free From Fall Injury:   Instruct family/caregiver on patient safety   Based on caregiver fall risk screen, instruct family/caregiver to ask for assistance with transferring infant if caregiver noted to have fall risk factors  4/1/2025 0633 by Katelin Soto RN  Outcome: Progressing

## 2025-04-01 NOTE — CARE COORDINATION
4/1/25 Pt admitted for hammer toe of right foot & had amputee of rt 2nd toe on 3/28. Pt currently 50% weight bearing of Rt leg. Per IDR & Nurse, phys & therapy recommending [facility] placement prior to pts return home independently. Pt ordered IV fluids. Room air. Followed up with pt @ bedside & discussed [facility] recommendation; provided SNF list. Pt declined to discuss options requesting that Gely/Dtr be contacted. Tct Gely/Dtr, provided update & per discussion Gely reported that she & her spouse lives with pt, but stated \"she's independent woman and would never admit to it.\" Gely adamant that plan is for pt to return home with [facility] & requested that PT/OT be added to currently nursing services being received. Dtr reported they live in a 1 leel home with 1ste. Provided update to nurse for phys to be updated. HHC order completed & indicates [facility] as provider, but pt is currently active with [facility] for nursing & order will need updated prior to discharge. WOUND CARE/NURSING NEEDS DETERMINED; HHC ORDER NEEDS COMPLETED FOR [facility] WITH NEW WOUND CARE ORDERS/INSTRUCTIONS & PT/OT ADDED. SW following.Electronically signed by GLORIA Hawkins on 4/1/2025 at 1:59 PM        The Plan for Transition of Care is related to the following treatment goals: SNF    The Patient was provided with a choice of provider and agrees   with the discharge plan. [x] Yes [] No    Freedom of choice list was provided with basic dialogue that supports the patient's individualized plan of care/goals, treatment preferences and shares the quality data associated with the providers. [x] Yes [] No

## 2025-04-02 VITALS
TEMPERATURE: 98.2 F | SYSTOLIC BLOOD PRESSURE: 154 MMHG | HEIGHT: 66 IN | DIASTOLIC BLOOD PRESSURE: 66 MMHG | BODY MASS INDEX: 26.84 KG/M2 | OXYGEN SATURATION: 97 % | HEART RATE: 87 BPM | WEIGHT: 167 LBS | RESPIRATION RATE: 18 BRPM

## 2025-04-02 PROCEDURE — 6370000000 HC RX 637 (ALT 250 FOR IP): Performed by: INTERNAL MEDICINE

## 2025-04-02 PROCEDURE — 6370000000 HC RX 637 (ALT 250 FOR IP): Performed by: SPECIALIST

## 2025-04-02 PROCEDURE — 6360000002 HC RX W HCPCS: Performed by: INTERNAL MEDICINE

## 2025-04-02 RX ORDER — METOPROLOL SUCCINATE 25 MG/1
25 TABLET, EXTENDED RELEASE ORAL DAILY
Qty: 30 TABLET | Refills: 3 | Status: SHIPPED | OUTPATIENT
Start: 2025-04-03

## 2025-04-02 RX ORDER — LISINOPRIL 40 MG/1
40 TABLET ORAL DAILY
Qty: 30 TABLET | Refills: 3 | Status: SHIPPED | OUTPATIENT
Start: 2025-04-02

## 2025-04-02 RX ADMIN — ROSUVASTATIN CALCIUM 20 MG: 20 TABLET, FILM COATED ORAL at 07:50

## 2025-04-02 RX ADMIN — CLOPIDOGREL BISULFATE 75 MG: 75 TABLET, FILM COATED ORAL at 07:50

## 2025-04-02 RX ADMIN — METOPROLOL SUCCINATE 25 MG: 25 TABLET, EXTENDED RELEASE ORAL at 07:49

## 2025-04-02 RX ADMIN — FUROSEMIDE 20 MG: 20 TABLET ORAL at 07:50

## 2025-04-02 RX ADMIN — GABAPENTIN 300 MG: 300 CAPSULE ORAL at 07:49

## 2025-04-02 RX ADMIN — AMIODARONE HYDROCHLORIDE 100 MG: 200 TABLET ORAL at 07:49

## 2025-04-02 RX ADMIN — PANTOPRAZOLE SODIUM 40 MG: 40 TABLET, DELAYED RELEASE ORAL at 07:50

## 2025-04-02 RX ADMIN — ONDANSETRON 4 MG: 2 INJECTION INTRAMUSCULAR; INTRAVENOUS at 09:17

## 2025-04-02 RX ADMIN — Medication 1 TABLET: at 07:49

## 2025-04-02 RX ADMIN — CEPHALEXIN 500 MG: 250 CAPSULE ORAL at 05:55

## 2025-04-02 RX ADMIN — AMLODIPINE BESYLATE 5 MG: 5 TABLET ORAL at 07:49

## 2025-04-02 RX ADMIN — APIXABAN 5 MG: 5 TABLET, FILM COATED ORAL at 07:49

## 2025-04-02 NOTE — CARE COORDINATION
DC order noted.  FABIEN called and spoke with daughter, Gely who confirms [facility], they DO NOT want [facility].  HHC orders in [facility], FABIEN called and notified Timmy at [facility] of DC and HHC orders.  They will follow up with patient after DC for [facility].  Gely states she will  patient and take her home, she plans to come by noon to get patient.

## 2025-04-02 NOTE — PROGRESS NOTES
osteomyelitis of the second digit proximal phalanx, edema cellulitis of forefoot ankle degenerative changes of TMT's location and PIPJ of second digit.   - Dressing: Xeroform, DSD QOD changes.   - WB Status: Partial weightbearing with 50% in surgical shoe to heel right foot.  Full weightbearing left foot  - Patient underwent second digit amputation and is healing well.  Patient can follow-up outpatient for eventual removal of sutures and wound check.  - Will continue to follow patient while they are in-house.    - Discussed patient with Dr.Ramy Esposito, MARTIRM FACFAS  Fellowship-Trained Foot and Ankle Surgeon  Diplomate, [employer] Foot [employer]le Sur[employer]  [phone]

## 2025-04-02 NOTE — PROGRESS NOTES
Instructed by Dr. Birch to change discharge medications to what they were on here. Due to the patient being changed due to positive orthostatics

## 2025-04-04 LAB — SURGICAL PATHOLOGY REPORT: NORMAL

## 2025-04-04 NOTE — PROGRESS NOTES
CLINICAL PHARMACY NOTE: MEDS TO BEDS    Total # of Prescriptions Filled: 1   The following medications were delivered to the patient:  Cephalexin 500 mg    Additional Documentation:    Lisinopril and Metoprolol were too soon to refill.

## (undated) DEVICE — NEEDLE HYPO 18GA L1.5IN PNK POLYPR HUB S STL THN WALL FILL

## (undated) DEVICE — 3M™ STERI-STRIP™ REINFORCED ADHESIVE SKIN CLOSURES, R1547, 1/2 IN X 4 IN (12 MM X 100 MM), 6 STRIPS/ENVELOPE: Brand: 3M™ STERI-STRIP™

## (undated) DEVICE — BANDAGE COMPR M W4INXL10YD WHT BGE VELC E MTRX HK AND LOOP

## (undated) DEVICE — STERILE LATEX POWDER-FREE SURGICAL GLOVESWITH NITRILE COATING: Brand: PROTEXIS

## (undated) DEVICE — SYRINGE MED 10ML TRNSLUC BRL PLUNG BLK MRK POLYPR CTRL

## (undated) DEVICE — PACK,UNIVERSAL,NO GOWNS: Brand: MEDLINE

## (undated) DEVICE — STERILE POLYISOPRENE POWDER-FREE SURGICAL GLOVES: Brand: PROTEXIS

## (undated) DEVICE — SOLUTION IV IRRIG POUR BRL 0.9% SODIUM CHL 2F7124

## (undated) DEVICE — TOWEL,OR,DSP,ST,BLUE,STD,6/PK,12PK/CS: Brand: MEDLINE

## (undated) DEVICE — COVER,LIGHT HANDLE,FLX,1/PK: Brand: MEDLINE INDUSTRIES, INC.

## (undated) DEVICE — SPECIMEN CUP W/LID: Brand: DEROYAL

## (undated) DEVICE — APPLICATOR MEDICATED 26 CC SOLUTION HI LT ORNG CHLORAPREP

## (undated) DEVICE — PATIENT RETURN ELECTRODE, SINGLE-USE, CONTACT QUALITY MONITORING, ADULT, WITH 9FT CORD, FOR PATIENTS WEIGING OVER 33LBS. (15KG): Brand: MEGADYNE

## (undated) DEVICE — WIPES SKIN CLOTH READYPREP 9 X 10.5 IN 2% CHLORHEX GLUCONATE CHG PREOP

## (undated) DEVICE — DRESSING,GAUZE,XEROFORM,CURAD,5"X9",ST: Brand: CURAD

## (undated) DEVICE — SKIN PREP TRAY 4 COMPARTM TRAY: Brand: MEDLINE INDUSTRIES, INC.

## (undated) DEVICE — SWAB CULT SGL AMIES W/O CHAR FOR THRT VAG SKIN HRT CULTSWAB

## (undated) DEVICE — SYRINGE MED 10ML LUERLOCK TIP W/O SFTY DISP

## (undated) DEVICE — 3M™ TEGADERM™ TRANSPARENT FILM DRESSING FRAME STYLE WITH BORDER, 1614, 2-3/8 IN X 2-3/4 IN (6CM X 7CM), 100/CT 4 CT/CASE: Brand: 3M™ TEGADERM™

## (undated) DEVICE — PEN: MARKING STD 100/CS: Brand: MEDICAL ACTION INDUSTRIES

## (undated) DEVICE — YANKAUER,BULB TIP,W/O VENT,RIGID,STERILE: Brand: MEDLINE

## (undated) DEVICE — GLOVE ORTHO 7   MSG9470

## (undated) DEVICE — NDL CNTR 40CT FM MAG: Brand: MEDLINE INDUSTRIES, INC.

## (undated) DEVICE — BANDAGE,GAUZE,4.5"X4.1YD,STERILE,LF: Brand: MEDLINE

## (undated) DEVICE — HANDLE CVR PATENTED RETENTION DISC STRL LIGHT SHLD

## (undated) DEVICE — INTENDED FOR TISSUE SEPARATION, AND OTHER PROCEDURES THAT REQUIRE A SHARP SURGICAL BLADE TO PUNCTURE OR CUT.: Brand: BARD-PARKER ® STAINLESS STEEL BLADES

## (undated) DEVICE — CONTROL SYRINGE LUER-LOCK TIP: Brand: MONOJECT

## (undated) DEVICE — E-Z CLEAN, NON-STICK, PTFE COATED, ELECTROSURGICAL BLADE ELECTRODE, 2.5 INCH (6.35 CM): Brand: EZ CLEAN

## (undated) DEVICE — SET INSTRUMENT MINOR PLASTICS

## (undated) DEVICE — MARKER,SKIN,WI/RULER AND LABELS: Brand: MEDLINE

## (undated) DEVICE — SURGICAL PROCEDURE PACK BASIC

## (undated) DEVICE — PACK PROC ORTH LO EXT IX CUST

## (undated) DEVICE — STAPLER SKIN L39MM DIA0.53MM CRWN 5.7MM S STL FIX HD PROX

## (undated) DEVICE — TUBING SUCT 12FR MAL ALUM SHFT FN CAP VENT UNIV CONN W/ OBT

## (undated) DEVICE — PADDING,UNDERCAST,COTTON, 4"X4YD STERILE: Brand: MEDLINE

## (undated) DEVICE — GAUZE,SPONGE,4"X4",16PLY,STRL,LF,10/TRAY: Brand: MEDLINE

## (undated) DEVICE — BLADE,STAINLESS-STEEL,10,STRL,DISPOSABLE: Brand: MEDLINE

## (undated) DEVICE — CLOTH SURG PREP PREOPERATIVE CHLORHEXIDINE GLUC 2% READYPREP

## (undated) DEVICE — DRESSING GZ W1XL8IN COT XRFRM N ADH OVERWRAP CURAD

## (undated) DEVICE — ELECTRODE PT RET AD L9FT HI MOIST COND ADH HYDRGEL CORDED

## (undated) DEVICE — ADHESIVE SKIN CLOSURE TOP 36 CC HI VISC DERMBND MINI

## (undated) DEVICE — GARMENT,MEDLINE,DVT,INT,CALF,MED, GEN2: Brand: MEDLINE

## (undated) DEVICE — APPLICATOR COTTONTIP 6IN NS 1000 CA

## (undated) DEVICE — 3M™ TEGADERM™ TRANSPARENT FILM DRESSING FRAME STYLE, 1626W, 4 IN X 4-3/4 IN (10 CM X 12 CM), 50/CT 4CT/CASE: Brand: 3M™ TEGADERM™

## (undated) DEVICE — PACK,EXTREMITY I: Brand: MEDLINE

## (undated) DEVICE — BASIC DOUBLE BASIN 2-LF: Brand: MEDLINE INDUSTRIES, INC.

## (undated) DEVICE — DRESSING NEG PRSS L W15XH3.3XL26CM BLK POLYUR DRP PD

## (undated) DEVICE — SPONGE LAP W18XL18IN WHT COT 4 PLY FLD STRUNG RADPQ DISP ST 2 PER PACK

## (undated) DEVICE — NEEDLE HYPO 25GA L1.5IN BLU POLYPR HUB S STL REG BVL STR

## (undated) DEVICE — STANDARD SURGICAL GOWN, L: Brand: CONVERTORS

## (undated) DEVICE — DRESSING GZ XRFRM 4X4(25/BX 6BX/CS)

## (undated) DEVICE — NEPTUNE E-SEP SMOKE EVACUATION PENCIL, COATED, 70MM BLADE, PUSH BUTTON SWITCH: Brand: NEPTUNE E-SEP

## (undated) DEVICE — E-Z CLEAN, NON-STICK, PTFE COATED, ELECTROSURGICAL BLADE ELECTRODE, 4 INCH (10.2 CM): Brand: MEGADYNE

## (undated) DEVICE — ADHESIVE SKIN CLOSURE 0.7CC TOP MICROBIAL APPL DERMBND ADV

## (undated) DEVICE — BNDG,ELSTC,MATRIX,STRL,4"X5YD,LF,HOOK&LP: Brand: MEDLINE

## (undated) DEVICE — ZIMMER® STERILE DISPOSABLE TOURNIQUET CUFF WITH PROTECTIVE SLEEVE AND PLC, DUAL PORT, SINGLE BLADDER, 18 IN. (46 CM)

## (undated) DEVICE — 12 ML SYRINGE,LUER-LOCK TIP: Brand: MONOJECT

## (undated) DEVICE — 4-PORT MANIFOLD: Brand: NEPTUNE 2

## (undated) DEVICE — GOWN,SIRUS,NONRNF,SETINSLV,XL,20/CS: Brand: MEDLINE

## (undated) DEVICE — TUBING, SUCTION, 1/4" X 10', STRAIGHT: Brand: MEDLINE

## (undated) DEVICE — BANDAGE,SELF ADHRNT,COFLEX,4"X5YD,STRL: Brand: COLABEL

## (undated) DEVICE — DRESSING PETRO W3XL3IN OIL EMUL N ADH GZ KNIT IMPREG CELOS

## (undated) DEVICE — NEEDLE HYPO 27GA L1.25IN GRY POLYPR HUB S STL REG BVL STR

## (undated) DEVICE — COTTON STRIP: Brand: DEROYAL

## (undated) DEVICE — GAUZE,SPONGE,4"X4",12PLY,STERILE,LF,2'S: Brand: MEDLINE

## (undated) DEVICE — NEEDLE FLTR 18GA L1.5IN MEM THK5UM BLNT DISP

## (undated) DEVICE — CHLORAPREP 26ML ORANGE

## (undated) DEVICE — 3M™ MEDIPORE™ SOFT CLOTH TAPE, 4 INCH X 10 YARDS, 12 ROLLS/CASE, 2964: Brand: 3M™ MEDIPORE™

## (undated) DEVICE — SYRINGE IRRIG 60ML SFT PLIABLE BLB EZ TO GRP 1 HND USE W/

## (undated) DEVICE — MEDI-VAC NON-CONDUCTIVE SUCTION TUBING: Brand: CARDINAL HEALTH